# Patient Record
Sex: FEMALE | Race: BLACK OR AFRICAN AMERICAN | NOT HISPANIC OR LATINO | Employment: FULL TIME | URBAN - METROPOLITAN AREA
[De-identification: names, ages, dates, MRNs, and addresses within clinical notes are randomized per-mention and may not be internally consistent; named-entity substitution may affect disease eponyms.]

---

## 2019-07-30 ENCOUNTER — TRANSCRIBE ORDERS (OUTPATIENT)
Dept: ADMINISTRATIVE | Facility: HOSPITAL | Age: 39
End: 2019-07-30

## 2019-07-30 DIAGNOSIS — M31.30 WEGENER'S GRANULOMATOSIS: Primary | ICD-10-CM

## 2019-08-11 ENCOUNTER — HOSPITAL ENCOUNTER (OUTPATIENT)
Dept: CT IMAGING | Facility: HOSPITAL | Age: 39
Discharge: HOME | End: 2019-08-11
Payer: COMMERCIAL

## 2019-08-11 DIAGNOSIS — M31.30 WEGENER'S GRANULOMATOSIS: ICD-10-CM

## 2019-08-11 PROCEDURE — 71260 CT THORAX DX C+: CPT

## 2019-08-11 RX ADMIN — IOHEXOL 85 ML: 350 INJECTION, SOLUTION INTRAVENOUS at 13:06

## 2019-08-14 ENCOUNTER — HOSPITAL ENCOUNTER (EMERGENCY)
Facility: HOSPITAL | Age: 39
Discharge: HOME | End: 2019-08-14
Attending: EMERGENCY MEDICINE
Payer: COMMERCIAL

## 2019-08-14 VITALS
RESPIRATION RATE: 18 BRPM | TEMPERATURE: 98.1 F | SYSTOLIC BLOOD PRESSURE: 138 MMHG | HEART RATE: 90 BPM | WEIGHT: 213.85 LBS | DIASTOLIC BLOOD PRESSURE: 70 MMHG | OXYGEN SATURATION: 100 %

## 2019-08-14 DIAGNOSIS — J98.01 BRONCHOSPASM: Primary | ICD-10-CM

## 2019-08-14 DIAGNOSIS — M31.30 WEGENER'S GRANULOMATOSIS (GRANULOMATOSIS WITH POLYANGIITIS): ICD-10-CM

## 2019-08-14 PROCEDURE — 99284 EMERGENCY DEPT VISIT MOD MDM: CPT

## 2019-08-14 PROCEDURE — 94640 AIRWAY INHALATION TREATMENT: CPT

## 2019-08-14 PROCEDURE — 99284 EMERGENCY DEPT VISIT MOD MDM: CPT | Performed by: EMERGENCY MEDICINE

## 2019-08-14 RX ORDER — PREDNISONE 10 MG/1
TABLET ORAL
Qty: 54 TABLET | Refills: 0 | Status: SHIPPED | OUTPATIENT
Start: 2019-08-14 | End: 2019-09-16 | Stop reason: ALTCHOICE

## 2019-08-14 RX ORDER — PREDNISONE 20 MG/1
60 TABLET ORAL ONCE
Status: COMPLETED | OUTPATIENT
Start: 2019-08-14 | End: 2019-08-14

## 2019-08-14 RX ORDER — ALBUTEROL SULFATE 2.5 MG/3ML
5 SOLUTION RESPIRATORY (INHALATION) ONCE
Status: COMPLETED | OUTPATIENT
Start: 2019-08-14 | End: 2019-08-14

## 2019-08-14 RX ORDER — ACETAMINOPHEN 325 MG/1
650 TABLET ORAL ONCE
Status: DISCONTINUED | OUTPATIENT
Start: 2019-08-14 | End: 2019-08-14

## 2019-08-14 RX ORDER — ALBUTEROL SULFATE 2.5 MG/3ML
5 SOLUTION RESPIRATORY (INHALATION) ONCE
Status: DISCONTINUED | OUTPATIENT
Start: 2019-08-14 | End: 2019-08-14

## 2019-08-14 RX ORDER — ALBUTEROL SULFATE 90 UG/1
2 AEROSOL, METERED RESPIRATORY (INHALATION) EVERY 4 HOURS PRN
Qty: 1 INHALER | Refills: 0 | Status: SHIPPED | OUTPATIENT
Start: 2019-08-14 | End: 2020-01-03 | Stop reason: SDUPTHER

## 2019-08-14 RX ADMIN — IPRATROPIUM BROMIDE 0.5 MG: 0.5 SOLUTION RESPIRATORY (INHALATION) at 20:05

## 2019-08-14 RX ADMIN — IPRATROPIUM BROMIDE 0.5 MG: 0.5 SOLUTION RESPIRATORY (INHALATION) at 18:03

## 2019-08-14 RX ADMIN — ALBUTEROL SULFATE 5 MG: 2.5 SOLUTION RESPIRATORY (INHALATION) at 18:02

## 2019-08-14 RX ADMIN — ALBUTEROL SULFATE 5 MG: 2.5 SOLUTION RESPIRATORY (INHALATION) at 20:07

## 2019-08-14 RX ADMIN — PREDNISONE 60 MG: 20 TABLET ORAL at 18:27

## 2019-08-14 NOTE — ED PROVIDER NOTES
History  Chief Complaint   Patient presents with    Breathing Difficulty     Pt presents to ED due to c/o intermittent wheezing for past month  CT scan performed as outpatient with rheumatologist, lung nodules discovered  Denies hx asthma  79-year-old female with a past medical history significant for Wegener's granulomatosis presents today complaining of worsening shortness of breath over the last week  States her symptoms are worse with increased humidity  Had a CT scan done on Sunday by her rheumatologist  Jg Lopez changes consistent with Wegeners granulomatosis and pulmonary nodules that were recommended to have follow up annually  History provided by:  Patient  Shortness of Breath   Severity:  Moderate  Onset quality:  Gradual  Timing:  Constant  Progression:  Worsening  Chronicity:  New  Context comment:  See HPI  Relieved by:  None tried  Worsened by:  Nothing  Ineffective treatments:  None tried  Associated symptoms: wheezing    Associated symptoms: no abdominal pain, no chest pain, no fever, no headaches, no hemoptysis, no rash, no sore throat and no sputum production    Risk factors: no hx of cancer, no hx of PE/DVT, no prolonged immobilization and no tobacco use        None       Past Medical History:   Diagnosis Date    Wegener's granulomatosis ([facility] [address] )        History reviewed  No pertinent surgical history  History reviewed  No pertinent family history  I have reviewed and agree with the history as documented  Social History     Tobacco Use    Smoking status: Never Smoker    Smokeless tobacco: Never Used   Substance Use Topics    Alcohol use: Yes     Comment: social    Drug use: Never        Review of Systems   Constitutional: Negative for chills, fatigue and fever  HENT: Negative for postnasal drip, sore throat and trouble swallowing  Respiratory: Positive for chest tightness, shortness of breath and wheezing  Negative for hemoptysis and sputum production      Cardiovascular: Negative for chest pain  Gastrointestinal: Negative for abdominal pain  Genitourinary: Negative for dysuria  Musculoskeletal: Negative for back pain  Skin: Negative for rash  Allergic/Immunologic: Negative for immunocompromised state  Neurological: Negative for dizziness, light-headedness and headaches  Psychiatric/Behavioral: Negative for confusion  Physical Exam  Physical Exam   Constitutional: She is oriented to person, place, and time  She appears well-developed and well-nourished  HENT:   Head: Normocephalic and atraumatic  Mouth/Throat: Uvula is midline, oropharynx is clear and moist and mucous membranes are normal  No tonsillar exudate  Eyes: Pupils are equal, round, and reactive to light  Neck: Normal range of motion  Neck supple  Cardiovascular: Normal rate and regular rhythm  Pulmonary/Chest: Effort normal    Decreased breath sounds in all lung fields  Abdominal: Soft  Bowel sounds are normal  There is no tenderness  There is no rebound and no guarding  Musculoskeletal: She exhibits no edema, tenderness or deformity  Neurological: She is alert and oriented to person, place, and time  Patient moving all extremities equally, no focal neuro deficits noted  Skin: Skin is warm and dry  Capillary refill takes less than 2 seconds  Psychiatric: She has a normal mood and affect  Nursing note and vitals reviewed        Vital Signs  ED Triage Vitals [08/14/19 1807]   Temperature Pulse Respirations Blood Pressure SpO2   98 1 °F (36 7 °C) 88 22 136/76 98 %      Temp Source Heart Rate Source Patient Position - Orthostatic VS BP Location FiO2 (%)   Axillary Monitor Sitting Right arm --      Pain Score       No Pain           Vitals:    08/14/19 1807 08/14/19 1815   BP: 136/76 136/76   Pulse: 88 88   Patient Position - Orthostatic VS: Sitting          Visual Acuity      ED Medications  Medications   albuterol inhalation solution 5 mg (5 mg Nebulization Given 8/14/19 1802) ipratropium (ATROVENT) 0 02 % inhalation solution 0 5 mg (0 5 mg Nebulization Given 8/14/19 1803)   predniSONE tablet 60 mg (60 mg Oral Given 8/14/19 1827)       Diagnostic Studies  Results Reviewed     None                 No orders to display              Procedures  Procedures       ED Course                               MDM  Number of Diagnoses or Management Options  Bronchospasm: new and requires workup  Wegener's granulomatosis (granulomatosis with polyangiitis) [facility]): new and requires workup  Diagnosis management comments: 7:26 PM  Feeling better after nebulizer  Lung sounds improved  Will observe in the ED for awhile and d/c home if remains asymptomatic  RTED instructions reviewed  Amount and/or Complexity of Data Reviewed  Tests in the radiology section of CPT®: reviewed  Review and summarize past medical records: yes    Risk of Complications, Morbidity, and/or Mortality  Presenting problems: moderate  Diagnostic procedures: moderate  Management options: moderate    Patient Progress  Patient progress: stable      Disposition  Final diagnoses:   Bronchospasm   Wegener's granulomatosis (granulomatosis with polyangiitis) ([facility] [address] )     Time reflects when diagnosis was documented in both MDM as applicable and the Disposition within this note     Time User Action Codes Description Comment    8/14/2019  7:26 PM Ariella Mcdonnell Add [J98 01] Bronchospasm     8/14/2019  7:27 PM Salinas Bartlett Add [M31 30] Wegener's granulomatosis (granulomatosis with polyangiitis) [facility])       ED Disposition     ED Disposition Condition Date/Time Comment    Discharge Stable Wed Aug 14, 2019  7:26 PM Genaro Libel discharge to home/self care              Follow-up Information     Follow up With Specialties Details Why Contact Info Additional Information    Alexx Jewell MD Family Medicine Schedule an appointment as soon as possible for a visit   [address]  [facility] [id number]  [facility] Schedule an appointment as soon as possible for a visit   [phone]  [address] [facility], [address] [place] [address] Emergency Department Emergency Medicine  If symptoms worsen [address] [facility]  AN ED, [address], [place], [place], [address]          Patient's Medications   Discharge Prescriptions    ALBUTEROL (PROVENTIL HFA,VENTOLIN HFA) 90 MCG/ACT INHALER    Inhale 2 puffs every 4 (four) hours as needed for wheezing       Start Date: 8/14/2019 End Date: --       Order Dose: 2 puffs       Quantity: 1 Inhaler    Refills: 0    PREDNISONE 10 MG TABLET    Take 6 tabs PO for 3 days, then 5 tabs PO for 3 days, then 4 tabs PO for 3 days, then 3 tabs PO for 3 days, then 2 tabs PO for 3 days then 1 tab PO for 3 days  Start Date: 8/14/2019 End Date: --       Order Dose: --       Quantity: 54 tablet    Refills: 0     No discharge procedures on file      ED Provider  Electronically Signed by           Rodolfo Ramos DO  08/14/19 [id number]

## 2019-08-14 NOTE — ED NOTES
Patient feeling "symptomatic again", walking with pulse oxgen monitor=100% with "breathing difficulty" per patient  Dr Madelyn Stephenson aware  Another breathing treatment to be ordered       [id number], RN  08/14/19 1958

## 2019-08-15 NOTE — ED NOTES
Patient "feels great" and going to be discharged per MD okay  Discharge instructions reviewed with patient and family  No further questions or concerns          [address], RN  08/14/19 6347

## 2019-08-16 ENCOUNTER — OFFICE VISIT (OUTPATIENT)
Dept: PULMONOLOGY | Facility: CLINIC | Age: 39
End: 2019-08-16
Payer: COMMERCIAL

## 2019-08-16 VITALS
WEIGHT: 208.2 LBS | DIASTOLIC BLOOD PRESSURE: 84 MMHG | HEART RATE: 98 BPM | RESPIRATION RATE: 18 BRPM | OXYGEN SATURATION: 97 % | BODY MASS INDEX: 29.15 KG/M2 | SYSTOLIC BLOOD PRESSURE: 122 MMHG | HEIGHT: 71 IN | TEMPERATURE: 98.5 F

## 2019-08-16 DIAGNOSIS — IMO0001: ICD-10-CM

## 2019-08-16 DIAGNOSIS — R09.82 POST-NASAL DRIP: ICD-10-CM

## 2019-08-16 DIAGNOSIS — R06.02 SHORTNESS OF BREATH: ICD-10-CM

## 2019-08-16 DIAGNOSIS — R91.8 MULTIPLE LUNG NODULES ON CT: Primary | ICD-10-CM

## 2019-08-16 PROCEDURE — 99214 OFFICE O/P EST MOD 30 MIN: CPT | Performed by: INTERNAL MEDICINE

## 2019-08-16 PROCEDURE — 94618 PULMONARY STRESS TESTING: CPT | Performed by: INTERNAL MEDICINE

## 2019-08-16 PROCEDURE — 94060 EVALUATION OF WHEEZING: CPT | Performed by: INTERNAL MEDICINE

## 2019-08-16 RX ORDER — FLUTICASONE FUROATE AND VILANTEROL 100; 25 UG/1; UG/1
1 POWDER RESPIRATORY (INHALATION) DAILY
Qty: 2 INHALER | Refills: 0 | Status: SHIPPED | COMMUNITY
Start: 2019-08-16 | End: 2019-09-16 | Stop reason: ALTCHOICE

## 2019-08-16 RX ORDER — PRENATAL VIT/IRON FUM/FOLIC AC 28MG-0.8MG
1 TABLET ORAL DAILY
Refills: 0 | COMMUNITY
Start: 2019-05-23 | End: 2021-08-06

## 2019-08-16 RX ORDER — VALACYCLOVIR HYDROCHLORIDE 500 MG/1
TABLET, FILM COATED ORAL
Refills: 0 | COMMUNITY
Start: 2019-05-10 | End: 2020-05-28 | Stop reason: SDUPTHER

## 2019-08-16 RX ORDER — FLUCONAZOLE 150 MG/1
TABLET ORAL
Refills: 0 | COMMUNITY
Start: 2019-05-23 | End: 2020-05-28 | Stop reason: SDUPTHER

## 2019-08-16 NOTE — ASSESSMENT & PLAN NOTE
-her in office spirometry did show significant response to bronchodilators which could signify underlying asthma versus a flare of her Naveed's  She reports possibly having a remote skin allergy test but does not recall the results but does state she has seasonal allergies  A 6 minutes walk test did not reveal any significant oxygen desaturation    Plan:  -patient given samples of Breo Ellipta 100 mcg 1 puff daily for trial   -continue rescue inhaler with albuterol 2 puffs q 4 hours p r n   -to also have a follow-up CT scan in 1 month in regards to her ground-glass nodules  -if her symptoms do not improve on the Breo, patient may require full pulmonary function testing, possibly an echocardiogram since she is at risk for pulmonary artery hypertension

## 2019-08-16 NOTE — ASSESSMENT & PLAN NOTE
-unclear etiology with this is related to her underlying Naveed's versus a small area of consolidation due to an infectious process  Her current symptoms appear to be related to possible underlying asthma versus her Naveed's in general and not related to the nodules in particular    Plan:  -we will repeat CT scan in 1 month  -patient will follow-up after CT scan for further evaluation

## 2019-08-16 NOTE — PROGRESS NOTES
Pulmonary Consultation   Genaro Mota 43 y o  female MRN: [id number]  8/16/2019      Assessment:    Multiple lung nodules on CT  -unclear etiology with this is related to her underlying Naveed's versus a small area of consolidation due to an infectious process  Her current symptoms appear to be related to possible underlying asthma versus her Naveed's in general and not related to the nodules in particular  Plan:  -we will repeat CT scan in 1 month  -patient will follow-up after CT scan for further evaluation    Post-nasal drip  -patient was previously told to try Flonase but she has not tried in the past   Her symptoms are typical of postnasal drip including the sensation mucus with in her throat and a morning cough  Plan  -instructed the patient to try over-the-counter Flonase    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)  -patient has a scheduled follow-up with rheumatology next Monday  Apparently her symptoms may be related to [facility] and she is on steroids from her recent hospitalization  She is now on chronic immunosuppressive therapy  She was on Cytoxan which she stopped 3 years ago  Shortness of breath  -her in office spirometry did show significant response to bronchodilators which could signify underlying asthma versus a flare of her Naveed's  She reports possibly having a remote skin allergy test but does not recall the results but does state she has seasonal allergies  A 6 minutes walk test did not reveal any significant oxygen desaturation    Plan:  -patient given samples of Breo Ellipta 100 mcg 1 puff daily for trial   -continue rescue inhaler with albuterol 2 puffs q 4 hours p r n   -to also have a follow-up CT scan in 1 month in regards to her ground-glass nodules  -if her symptoms do not improve on the Breo, patient may require full pulmonary function testing, possibly an echocardiogram since she is at risk for pulmonary artery hypertension      Plan:    Diagnoses and all orders for this visit:    Multiple lung nodules on CT  -     CT chest without contrast; Future    Shortness of breath  -     POCT spirometry  with bronchodilator  -     POCT 6 minute walk  -     fluticasone-vilanterol (BREO ELLIPTA) 100-25 mcg/inh inhaler; Inhale 1 puff daily Rinse mouth after use  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[address] )  -     fluticasone-vilanterol (BREO ELLIPTA) 100-25 mcg/inh inhaler; Inhale 1 puff daily Rinse mouth after use  -     CT chest without contrast; Future    Post-nasal drip    Other orders  -     fluconazole (DIFLUCAN) 150 mg tablet; take 1 tablet by mouth ONCE FOR 1 DAY  -     Prenatal Vit-Fe Fumarate-FA (RA PRENATAL) 28-0 8 MG TABS; Take 1 tablet by mouth daily  -     valACYclovir (VALTREX) 500 mg tablet        History of Present Illness   HPI:  Genaro Mota is a 43 y o  female with a history of Wegeners granulomatosis who follows up with Rheumatology that presents due to recent increase in SOB and recent groundglass nodules noted on a CT of the chest    For the past 6 weeks patient has had increased SOB on exertion and occasionally at rest, dyspnea and wheezing  She reports that these symptoms were similar to the initial symptoms that lead to the diagnosis of her wegeners in 2007  She uses a smartphone health salo which she notes has recorded her pulse as being elevated even at rest  She recently went to the hospital due to SOB and was started on albuterol and steroid taper  She does have a f/up with her rheumatologist on Monday  She did use the inhaler yesterday and it did help  She does also mention experiencing epistaxis once per week and also a sensation of post nasal drip at night which causes her to wake up multiple times at night  She also has a cough in the morning that is nonproductive  She denies any fevers, chills, recent hemoptysis although she does report having some in the past but cannot tell whether this was due to epistaxis      Review of Systems   Constitutional: Positive for fatigue  Negative for chills, diaphoresis and fever  HENT: Positive for nosebleeds and postnasal drip  Negative for congestion, ear pain, rhinorrhea, sinus pressure, sinus pain, sneezing, sore throat, trouble swallowing and voice change  Eyes: Negative for pain, redness and itching  Respiratory: Positive for cough and wheezing  Negative for apnea, choking, chest tightness, shortness of breath and stridor  Cardiovascular: Negative for chest pain, palpitations and leg swelling  Gastrointestinal: Negative for abdominal distention, abdominal pain, blood in stool, constipation, diarrhea, nausea and vomiting  Endocrine: Negative for polydipsia and polyuria  Genitourinary: Negative for dysuria, flank pain, frequency and hematuria  Musculoskeletal: Negative for arthralgias, back pain, joint swelling, myalgias, neck pain and neck stiffness  Skin: Negative for color change, pallor and rash  Neurological: Negative for dizziness, seizures, syncope, weakness, light-headedness, numbness and headaches  Hematological: Negative for adenopathy  Psychiatric/Behavioral: Negative for agitation and confusion  Historical Information   Past Medical History:   Diagnosis Date    Multiple lung nodules on CT 8/16/2019    Shortness of breath 8/16/2019    Wegener's granulomatosis ([facility] [address] )      History reviewed  No pertinent surgical history  History reviewed  No pertinent family history  Occupational History: Works for [employer] as a  for the past 20 years and this requires her being outside 40-50% of the time  Social History: lifelong nonsmoker, social alcohol, no drug use  Patient lives with  and son  She does have a dog at home for the past 6 years       Meds/Allergies     Current Outpatient Medications:     albuterol (PROVENTIL HFA,VENTOLIN HFA) 90 mcg/act inhaler, Inhale 2 puffs every 4 (four) hours as needed for wheezing, Disp: 1 Inhaler, Rfl: 0    fluconazole (DIFLUCAN) 150 mg tablet, take 1 tablet by mouth ONCE FOR 1 DAY, Disp: , Rfl: 0    predniSONE 10 mg tablet, Take 6 tabs PO for 3 days, then 5 tabs PO for 3 days, then 4 tabs PO for 3 days, then 3 tabs PO for 3 days, then 2 tabs PO for 3 days then 1 tab PO for 3 days  , Disp: 54 tablet, Rfl: 0    Prenatal Vit-Fe Fumarate-FA (RA PRENATAL) 28-0 8 MG TABS, Take 1 tablet by mouth daily, Disp: , Rfl: 0    valACYclovir (VALTREX) 500 mg tablet, , Disp: , Rfl: 0    fluticasone-vilanterol (BREO ELLIPTA) 100-25 mcg/inh inhaler, Inhale 1 puff daily Rinse mouth after use , Disp: 2 Inhaler, Rfl: 0  Allergies   Allergen Reactions    Ibuprofen Hives    Other      seasonal       Vitals: Blood pressure 122/84, pulse 98, temperature 98 5 °F (36 9 °C), resp  rate 18, height 5' 11" (1 803 m), weight 94 4 kg (208 lb 3 2 oz), SpO2 97 %  , Body mass index is 29 04 kg/m²  Oxygen Therapy  SpO2: 97 %  Oxygen Therapy: None (Room air)    Physical Exam  Physical Exam   Constitutional: She is oriented to person, place, and time  No distress  HENT:   Head: Normocephalic and atraumatic  Nose: Nose normal    Mouth/Throat: Oropharynx is clear and moist  No oropharyngeal exudate  Eyes: Pupils are equal, round, and reactive to light  Conjunctivae and EOM are normal  No scleral icterus  Neck: Normal range of motion  Neck supple  No JVD present  No tracheal deviation present  No thyromegaly present  Cardiovascular: Normal rate, regular rhythm, normal heart sounds and intact distal pulses  Exam reveals no gallop and no friction rub  No murmur heard  Pulmonary/Chest: Effort normal and breath sounds normal  No stridor  No respiratory distress  She has no wheezes  She has no rales  Abdominal: Soft  Bowel sounds are normal  She exhibits no distension  There is no tenderness  There is no rebound and no guarding  Musculoskeletal: Normal range of motion  She exhibits no edema or deformity     Lymphadenopathy:     She has no cervical adenopathy  Neurological: She is alert and oriented to person, place, and time  No cranial nerve deficit or sensory deficit  Skin: Skin is warm  No rash noted  She is not diaphoretic  No erythema  Psychiatric: She has a normal mood and affect  Labs: none on file    Imaging and other studies: I have personally reviewed pertinent reports  CT 08/11/2019- shows 2 small ground-glass opacification in the right lower lobe otherwise lungs are clear bilaterally without any effusions, masses  Pulmonary function testing: none on file    6 minutes walk test:  Patient ambulated 504 m  Resting pulse ox of 97% which actually increased to 100% while ambulation  Resting heart rate of 97 beats per minute which increased to 119      Spirometry pre and post bronchodilation:  Pre-  FEV1- 47% predicted  FVC 62% predicted  FEV1/FVC 63%  Post-  FEV1 55% predicted  FVC 62% predicted  FEV1/FVC 79%  There was a 15% increase in FEV1 post bronchodilator along with greater than a 200 mL increase    EKG, Pathology, and Other Studies: none on file    Jenn Giron MD  Pulmonary and Critical Care Fellow- PGY 4  [facility]

## 2019-08-16 NOTE — ASSESSMENT & PLAN NOTE
-patient has a scheduled follow-up with rheumatology next Monday  Apparently her symptoms may be related to [facility] and she is on steroids from her recent hospitalization  She is now on chronic immunosuppressive therapy  She was on Cytoxan which she stopped 3 years ago

## 2019-08-16 NOTE — ASSESSMENT & PLAN NOTE
-patient was previously told to try Flonase but she has not tried in the past   Her symptoms are typical of postnasal drip including the sensation mucus with in her throat and a morning cough    Plan  -instructed the patient to try over-the-counter Flonase

## 2019-09-04 ENCOUNTER — TELEPHONE (OUTPATIENT)
Dept: PULMONOLOGY | Facility: CLINIC | Age: 39
End: 2019-09-04

## 2019-09-04 DIAGNOSIS — R91.8 MULTIPLE LUNG NODULES ON CT: Primary | ICD-10-CM

## 2019-09-04 NOTE — PROGRESS NOTES
Insurance is not approving repeat chest CT at this time  We will plan to proceed with chest x-ray    If nodules do not appear to be more prominent, we can wait to repeat the CT until November

## 2019-09-04 NOTE — TELEPHONE ENCOUNTER
Received denial letter from [facility] regarding our request for a chest CT for Ciara Aguayo  After discussing with Dr David Drake, I called Sonal Rowe and left a message for her  I explained that I was canceling the CT schedule for 9/9/19  I also explained that Dr David Drake had placed an order for a CXR  The order is in the Epic system and she can go to any [facility] to have it done  I left my contact information and asked Sonal Rowe to call me if she had any questions

## 2019-09-16 ENCOUNTER — OFFICE VISIT (OUTPATIENT)
Dept: PULMONOLOGY | Facility: CLINIC | Age: 39
End: 2019-09-16
Payer: COMMERCIAL

## 2019-09-16 VITALS
HEART RATE: 102 BPM | HEIGHT: 71 IN | RESPIRATION RATE: 18 BRPM | SYSTOLIC BLOOD PRESSURE: 130 MMHG | TEMPERATURE: 99 F | WEIGHT: 208 LBS | OXYGEN SATURATION: 98 % | BODY MASS INDEX: 29.12 KG/M2 | DIASTOLIC BLOOD PRESSURE: 86 MMHG

## 2019-09-16 DIAGNOSIS — R91.8 MULTIPLE LUNG NODULES ON CT: ICD-10-CM

## 2019-09-16 DIAGNOSIS — R09.82 POST-NASAL DRIP: Primary | ICD-10-CM

## 2019-09-16 PROBLEM — R06.02 SHORTNESS OF BREATH: Status: RESOLVED | Noted: 2019-08-16 | Resolved: 2019-09-16

## 2019-09-16 PROCEDURE — 99214 OFFICE O/P EST MOD 30 MIN: CPT | Performed by: INTERNAL MEDICINE

## 2019-09-16 RX ORDER — FLUTICASONE PROPIONATE 50 MCG
2 SPRAY, SUSPENSION (ML) NASAL DAILY
Qty: 1 BOTTLE | Refills: 2 | Status: SHIPPED | OUTPATIENT
Start: 2019-09-16 | End: 2021-08-06

## 2019-09-16 RX ORDER — CETIRIZINE HYDROCHLORIDE 10 MG/1
10 TABLET ORAL DAILY
Qty: 30 TABLET | Refills: 0
Start: 2019-09-16 | End: 2021-08-06

## 2019-09-16 NOTE — ASSESSMENT & PLAN NOTE
She complains of postnasal drip  I instructed her to take Flonase once daily and Zyrtec once daily  There is no evidence of acute  bacterial infectious process

## 2019-09-16 NOTE — PROGRESS NOTES
Pulmonary Follow Up Note   Kaley Murray 43 y o  female MRN: [id number]  9/16/2019      Assessment/Plan:     Multiple lung nodules on CT    I suspect that the previously noted lung nodules were related to inflammatory process, as her symptoms have all resolved  She completed a course of prednisone  I would like her to have a repeat chest x-ray  If there are persistent nodules, then I will again ask the insurance company for approval for chest CT for direct comparison to the August scan  Call her with chest x-ray results  Post-nasal drip    She complains of postnasal drip  I instructed her to take Flonase once daily and Zyrtec once daily  There is no evidence of acute  bacterial infectious process  Visit orders:    Diagnoses and all orders for this visit:    Post-nasal drip  -     fluticasone (FLONASE) 50 mcg/act nasal spray; 2 sprays into each nostril daily  -     cetirizine (ZyrTEC) 10 mg tablet; Take 1 tablet (10 mg total) by mouth daily    Multiple lung nodules on CT      History of Present Illness   HPI:  Kaley Murray is a 43 y o  female who  Is here today for follow-up regarding pulmonary nodules  She completed the course of prednisone per Rheumatology last week  Overall, her symptoms have completely resolved  She is no longer coughing or wheezing  This week, she developed sinus congestion with postnasal drip  She denies associated fevers or chills  She denies hemoptysis or epistaxis  She denies shortness of breath and is back to taking walks on a regular basis  She is not using any inhalers  At our last visit, I had ordered a repeat chest CT for this month  Insurance did not cover it, so I ordered a chest x-ray  Patient has not had that completed  Review of Systems    She denies headaches or vision changes  She complains of sneezing, sinus congestion and postnasal drip  She denies sore throat  She denies arthralgias or myalgias  She denies chest pain    She denies nausea, vomiting or diarrhea  She has some insomnia  She denies lower extremity edema or calf tenderness  She denies dysuria or hematuria  All other review of systems are negative  Historical Information   Past Medical History:   Diagnosis Date    Multiple lung nodules on CT 8/16/2019    Shortness of breath 8/16/2019    Wegener's granulomatosis ([facility] [address] )      History reviewed  No pertinent surgical history  History reviewed  No pertinent family history  Social History     Tobacco Use   Smoking Status Never Smoker   Smokeless Tobacco Never Used         Meds/Allergies     Current Outpatient Medications:     albuterol (PROVENTIL HFA,VENTOLIN HFA) 90 mcg/act inhaler, Inhale 2 puffs every 4 (four) hours as needed for wheezing, Disp: 1 Inhaler, Rfl: 0    cetirizine (ZyrTEC) 10 mg tablet, Take 1 tablet (10 mg total) by mouth daily, Disp: 30 tablet, Rfl: 0    fluconazole (DIFLUCAN) 150 mg tablet, take 1 tablet by mouth ONCE FOR 1 DAY, Disp: , Rfl: 0    fluticasone (FLONASE) 50 mcg/act nasal spray, 2 sprays into each nostril daily, Disp: 1 Bottle, Rfl: 2    Prenatal Vit-Fe Fumarate-FA (RA PRENATAL) 28-0 8 MG TABS, Take 1 tablet by mouth daily, Disp: , Rfl: 0    valACYclovir (VALTREX) 500 mg tablet, , Disp: , Rfl: 0  Allergies   Allergen Reactions    Ibuprofen Hives    Other      seasonal       Vitals: Blood pressure 130/86, pulse 102, temperature 99 °F (37 2 °C), temperature source Tympanic, resp  rate 18, height 5' 11" (1 803 m), weight 94 3 kg (208 lb), SpO2 98 %  Body mass index is 29 01 kg/m²  Oxygen Therapy  SpO2: 98 %    Physical Exam   Constitutional: She is oriented to person, place, and time  No distress  HENT:   Head: Normocephalic  Mouth/Throat: No oropharyngeal exudate  Eyes: Pupils are equal, round, and reactive to light  No scleral icterus  Neck: Neck supple  No JVD present  Cardiovascular: Normal rate and regular rhythm  No murmur heard  Pulmonary/Chest: She has no wheezes   She has no rales    Abdominal: Soft  There is no tenderness  Musculoskeletal: She exhibits no edema  Lymphadenopathy:     She has no cervical adenopathy  Neurological: She is alert and oriented to person, place, and time  Skin: Skin is warm and dry  Psychiatric: She has a normal mood and affect

## 2019-09-16 NOTE — ASSESSMENT & PLAN NOTE
I suspect that the previously noted lung nodules were related to inflammatory process, as her symptoms have all resolved  She completed a course of prednisone  I would like her to have a repeat chest x-ray  If there are persistent nodules, then I will again ask the insurance company for approval for chest CT for direct comparison to the August scan  Call her with chest x-ray results

## 2019-10-14 ENCOUNTER — HOSPITAL ENCOUNTER (OUTPATIENT)
Dept: RADIOLOGY | Facility: HOSPITAL | Age: 39
Discharge: HOME | End: 2019-10-14
Attending: INTERNAL MEDICINE
Payer: COMMERCIAL

## 2019-10-14 ENCOUNTER — TRANSCRIBE ORDERS (OUTPATIENT)
Dept: RADIOLOGY | Facility: HOSPITAL | Age: 39
End: 2019-10-14

## 2019-10-14 DIAGNOSIS — R91.8 MULTIPLE LUNG NODULES ON CT: ICD-10-CM

## 2019-10-14 PROCEDURE — 71046 X-RAY EXAM CHEST 2 VIEWS: CPT

## 2019-10-22 ENCOUNTER — TELEPHONE (OUTPATIENT)
Dept: PULMONOLOGY | Facility: CLINIC | Age: 39
End: 2019-10-22

## 2019-10-22 NOTE — TELEPHONE ENCOUNTER
Spoke with Chrissy Donnelly  She says she has been sick for a week  She was in [place] last week surrounded by smoke  She thought she would get better, but she has not  She has difficulty breathing doing routine activities  She has to stop and sit down  She says she also has a cold  She has a cough which first produced mucous, then was a dry cough and now producing mucous  She says the mucous is yellow and blood tinged  She has occasional wheezing and chest tightness with exertion  She has night sweats but says that is nothing new for her  Denies fever or chills  She uses her rescue inhaler but it does not help much  Please advise

## 2019-10-22 NOTE — TELEPHONE ENCOUNTER
Spoke with pt by phone  She has been sick for about 1 week since returning from her recent trip to [facility]  She had CXR done 10/14/19 which was clear showing no signs of infection or effusions  She states that she felt much better when on steroids and Breo  She complains of increased cough with some yellowish mucous  No fevers  She also has wheezing and chest tightness  She tells me "I do have a cold, but this is more than a cold "  She is unable to walk up a flight of stairs without having to stop to catch her breath  She also has noticed conversational dyspnea  She does not feel that she needs to go to the ER because "it's not going to change anything "  I have instructed her to start 40mg of prednisone and taper by 10mg every 3 days  She has prednisone at home and does not need a prescription at this time  She is also to restart Breo, which she also has  She was instructed to wash mouth out after each use  She will continue to use albuterol rescue inhaler as needed  She would like to be seen by Dr Bo Huerta but would be agreeable to be seen by another provider if she cannot see her  She would be agreeable to be seen in the [facility] or [place] offices  She is aware that if her breathing acutely worsens she needs to go to the ER for evaluation      I am in [facility] office tomorrow - we could have her see me at 3PM if that works for her

## 2019-10-23 NOTE — TELEPHONE ENCOUNTER
Spoke with patient  She says she is feeling better today  She is going to finish her taper and call us if this happens again

## 2019-11-29 ENCOUNTER — OFFICE VISIT (OUTPATIENT)
Dept: PULMONOLOGY | Facility: CLINIC | Age: 39
End: 2019-11-29
Payer: COMMERCIAL

## 2019-11-29 VITALS
WEIGHT: 210 LBS | OXYGEN SATURATION: 100 % | TEMPERATURE: 98.1 F | HEART RATE: 109 BPM | HEIGHT: 71 IN | BODY MASS INDEX: 29.4 KG/M2 | DIASTOLIC BLOOD PRESSURE: 80 MMHG | SYSTOLIC BLOOD PRESSURE: 114 MMHG | RESPIRATION RATE: 16 BRPM

## 2019-11-29 DIAGNOSIS — IMO0001: Primary | ICD-10-CM

## 2019-11-29 DIAGNOSIS — R06.02 SHORTNESS OF BREATH: ICD-10-CM

## 2019-11-29 DIAGNOSIS — R91.8 MULTIPLE LUNG NODULES ON CT: ICD-10-CM

## 2019-11-29 PROCEDURE — 99215 OFFICE O/P EST HI 40 MIN: CPT | Performed by: INTERNAL MEDICINE

## 2019-11-29 RX ORDER — FLUTICASONE FUROATE AND VILANTEROL 200; 25 UG/1; UG/1
1 POWDER RESPIRATORY (INHALATION) DAILY
Qty: 1 INHALER | Refills: 5 | Status: SHIPPED | OUTPATIENT
Start: 2019-11-29 | End: 2021-08-06

## 2019-11-29 NOTE — ASSESSMENT & PLAN NOTE
Once she has come off the steroids, we will repeat chest CT for follow-up of previously noted pulmonary nodules  This will help determine whether we are dealing with underlying  Naveed's granulomatosis  I would favor obtaining the scan once she is off steroids, as the steroids may obscure the results  It is imperative that we follow up these lung nodules, particularly given the solid component in the larger of the 2 nodules

## 2019-11-29 NOTE — ASSESSMENT & PLAN NOTE
I spoke with Dr Ratna Tran  During the patient's office visit today  For now, we will continue on the prednisone taper as outlined  If the chest imaging is suggestive of Naveed's granulomatosis, I would defer to her regarding which steroid sparing agent to treat with  We also reviewed her recent blood work which showed an elevated CA-3,  Which is a nonspecific finding, however would be  supportive of a diagnosis of Naveed's in the right clinical context

## 2019-11-29 NOTE — PATIENT INSTRUCTIONS
·  Start taking Breo Ellipta 200 mcg, 1 puff daily    Rinse mouth out after each use  ·  After you have been on the [facility] for 1 week (12/6/19), decrease prednisone to 15 mg daily  ·  On 12/13/19, decrease prednisone to 10 mg daily  ·  On 12/20/19, decrease prednisone 5 mg daily  ·  Discontinue prednisone on 12/27/19

## 2019-11-29 NOTE — PROGRESS NOTES
Pulmonary Follow Up Note   Rosa Victoria 43 y o  female MRN: [id number]  11/29/2019      Assessment/Plan:     Shortness of breath   The patient is struggling with shortness of breath every time she is weaned off of steroids  At our visit in August, spirometry showed moderate obstruction with bronchodilator response, raising the possibility of underlying asthma  In an effort to reduce the steroid dose, I will prescribe Breo Ellipta 200 mcg to use once daily  After she has been on the inhaler for 1 week, we will decrease prednisone by 5 mg every week, discontinuing completely on 12/27/19  Multiple lung nodules on CT    Once she has come off the steroids, we will repeat chest CT for follow-up of previously noted pulmonary nodules  This will help determine whether we are dealing with underlying  Naveed's granulomatosis  I would favor obtaining the scan once she is off steroids, as the steroids may obscure the results  It is imperative that we follow up these lung nodules, particularly given the solid component in the larger of the 2 nodules  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[address] )    I spoke with Dr Ayo Baxter  During the patient's office visit today  For now, we will continue on the prednisone taper as outlined  If the chest imaging is suggestive of Naveed's granulomatosis, I would defer to her regarding which steroid sparing agent to treat with  We also reviewed her recent blood work which showed an elevated WA-3,  Which is a nonspecific finding, however would be  supportive of a diagnosis of Naveed's in the right clinical context  Visit orders:    Diagnoses and all orders for this visit:    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[address] )  -     Complete PFT with post bronchodilator; Future  -     fluticasone-vilanterol (BREO ELLIPTA) 200-25 MCG/INH inhaler; Inhale 1 puff daily Rinse mouth after use      Multiple lung nodules on CT  -     CT chest without contrast; Future    Shortness of breath        Return in about 4 weeks (around 12/27/2019)  History of Present Illness   HPI:  Lori Edwards is a 43 y o  female who is here today for follow-up regarding shortness of breath  At our last visit, she was feeling well without any complaints of shortness of breath  Since then, she was weaned off steroids on 2 separate occasions  Both times, she developed significant shortness of breath within days of coming off of steroids  She has cough with mostly clear sputum, but it is occasionally blood tinged  She has no fevers or chills  Her shortness of breath is most notable when walking  She is currently on prednisone 20 mg daily, however would very much like to come off the steroids  She has an albuterol inhaler which she uses on occasion and finds minimal relief from it  She denies significant wheezing  Review of Systems   Constitutional: Negative for chills, fever and unexpected weight change  HENT: Positive for postnasal drip  Negative for sore throat  Eyes: Negative for visual disturbance  Respiratory:        As noted in HPI   Cardiovascular: Negative for chest pain  Gastrointestinal: Negative for abdominal pain, diarrhea and vomiting  Genitourinary: Negative for difficulty urinating  Skin: Negative for rash  Neurological: Negative for headaches  Hematological: Negative for adenopathy  Psychiatric/Behavioral: Negative  All other systems reviewed and are negative  Historical Information   Past Medical History:   Diagnosis Date    Multiple lung nodules on CT 8/16/2019    Shortness of breath 8/16/2019    Wegener's granulomatosis ([facility] [address] )      History reviewed  No pertinent surgical history  History reviewed  No pertinent family history      Social History     Tobacco Use   Smoking Status Never Smoker   Smokeless Tobacco Never Used         Meds/Allergies     Current Outpatient Medications:     albuterol (PROVENTIL HFA,VENTOLIN HFA) 90 mcg/act inhaler, Inhale 2 puffs every 4 (four) hours as needed for wheezing, Disp: 1 Inhaler, Rfl: 0    cetirizine (ZyrTEC) 10 mg tablet, Take 1 tablet (10 mg total) by mouth daily, Disp: 30 tablet, Rfl: 0    fluconazole (DIFLUCAN) 150 mg tablet, take 1 tablet by mouth ONCE FOR 1 DAY, Disp: , Rfl: 0    fluticasone (FLONASE) 50 mcg/act nasal spray, 2 sprays into each nostril daily, Disp: 1 Bottle, Rfl: 2    Prenatal Vit-Fe Fumarate-FA (RA PRENATAL) 28-0 8 MG TABS, Take 1 tablet by mouth daily, Disp: , Rfl: 0    valACYclovir (VALTREX) 500 mg tablet, , Disp: , Rfl: 0    fluticasone-vilanterol (BREO ELLIPTA) 200-25 MCG/INH inhaler, Inhale 1 puff daily Rinse mouth after use , Disp: 1 Inhaler, Rfl: 5  Allergies   Allergen Reactions    Ibuprofen Hives    Other      seasonal       Vitals: Blood pressure 114/80, pulse (!) 109, temperature 98 1 °F (36 7 °C), temperature source Tympanic, resp  rate 16, height 5' 11" (1 803 m), weight 95 3 kg (210 lb), SpO2 100 %  Body mass index is 29 29 kg/m²  Oxygen Therapy  SpO2: 100 %      Physical Exam   Constitutional: She is oriented to person, place, and time  No distress  HENT:   Head: Normocephalic  Mouth/Throat: No oropharyngeal exudate  Eyes: Pupils are equal, round, and reactive to light  No scleral icterus  Neck: Neck supple  No JVD present  Cardiovascular: Normal rate and regular rhythm  Pulmonary/Chest: She has no wheezes  She has no rales  Abdominal: Soft  There is no tenderness  Musculoskeletal: She exhibits no edema  Lymphadenopathy:     She has no cervical adenopathy  Neurological: She is alert and oriented to person, place, and time  Skin: Skin is warm and dry  Psychiatric: She has a normal mood and affect  Imaging and other studies: I have personally reviewed pertinent reports  and I have personally reviewed pertinent films in PACS   Chest CT from 8/11/19 shows ground-glass opacity in the right lower lobe  The largest area measures 11 x 18 mm with a 2 mm solid focus

## 2019-11-29 NOTE — ASSESSMENT & PLAN NOTE
The patient is struggling with shortness of breath every time she is weaned off of steroids  At our visit in August, spirometry showed moderate obstruction with bronchodilator response, raising the possibility of underlying asthma  In an effort to reduce the steroid dose, I will prescribe Breo Ellipta 200 mcg to use once daily  After she has been on the inhaler for 1 week, we will decrease prednisone by 5 mg every week, discontinuing completely on 12/27/19

## 2019-12-03 DIAGNOSIS — R06.02 SOB (SHORTNESS OF BREATH): Primary | ICD-10-CM

## 2019-12-03 RX ORDER — PREDNISONE 1 MG/1
TABLET ORAL
Qty: 62 TABLET | Refills: 0 | Status: SHIPPED | OUTPATIENT
Start: 2019-12-03 | End: 2020-01-03 | Stop reason: ALTCHOICE

## 2019-12-06 ENCOUNTER — HOSPITAL ENCOUNTER (OUTPATIENT)
Dept: PULMONOLOGY | Facility: HOSPITAL | Age: 39
Discharge: HOME | End: 2019-12-06
Attending: INTERNAL MEDICINE
Payer: COMMERCIAL

## 2019-12-06 DIAGNOSIS — IMO0001: ICD-10-CM

## 2019-12-06 PROCEDURE — 94729 DIFFUSING CAPACITY: CPT | Performed by: INTERNAL MEDICINE

## 2019-12-06 PROCEDURE — 94726 PLETHYSMOGRAPHY LUNG VOLUMES: CPT

## 2019-12-06 PROCEDURE — 94760 N-INVAS EAR/PLS OXIMETRY 1: CPT

## 2019-12-06 PROCEDURE — 94729 DIFFUSING CAPACITY: CPT

## 2019-12-06 PROCEDURE — 94726 PLETHYSMOGRAPHY LUNG VOLUMES: CPT | Performed by: INTERNAL MEDICINE

## 2019-12-06 PROCEDURE — 94060 EVALUATION OF WHEEZING: CPT | Performed by: INTERNAL MEDICINE

## 2019-12-06 PROCEDURE — 94060 EVALUATION OF WHEEZING: CPT

## 2019-12-06 RX ORDER — ALBUTEROL SULFATE 2.5 MG/3ML
2.5 SOLUTION RESPIRATORY (INHALATION) ONCE
Status: COMPLETED | OUTPATIENT
Start: 2019-12-06 | End: 2019-12-06

## 2019-12-06 RX ADMIN — ALBUTEROL SULFATE 2.5 MG: 2.5 SOLUTION RESPIRATORY (INHALATION) at 16:39

## 2019-12-30 ENCOUNTER — HOSPITAL ENCOUNTER (OUTPATIENT)
Dept: CT IMAGING | Facility: HOSPITAL | Age: 39
Discharge: HOME | End: 2019-12-30
Attending: INTERNAL MEDICINE
Payer: COMMERCIAL

## 2019-12-30 DIAGNOSIS — R91.8 MULTIPLE LUNG NODULES ON CT: ICD-10-CM

## 2019-12-30 PROCEDURE — 71250 CT THORAX DX C-: CPT

## 2020-01-03 ENCOUNTER — OFFICE VISIT (OUTPATIENT)
Dept: PULMONOLOGY | Facility: CLINIC | Age: 40
End: 2020-01-03
Payer: COMMERCIAL

## 2020-01-03 VITALS
TEMPERATURE: 98.2 F | SYSTOLIC BLOOD PRESSURE: 130 MMHG | WEIGHT: 211 LBS | HEIGHT: 71 IN | HEART RATE: 102 BPM | BODY MASS INDEX: 29.54 KG/M2 | DIASTOLIC BLOOD PRESSURE: 88 MMHG | OXYGEN SATURATION: 97 %

## 2020-01-03 DIAGNOSIS — IMO0001: ICD-10-CM

## 2020-01-03 DIAGNOSIS — J45.40 MODERATE PERSISTENT ASTHMA WITHOUT COMPLICATION: ICD-10-CM

## 2020-01-03 DIAGNOSIS — R09.82 POST-NASAL DRIP: ICD-10-CM

## 2020-01-03 DIAGNOSIS — J98.01 BRONCHOSPASM: ICD-10-CM

## 2020-01-03 DIAGNOSIS — R91.8 MULTIPLE LUNG NODULES ON CT: Primary | ICD-10-CM

## 2020-01-03 PROCEDURE — 99215 OFFICE O/P EST HI 40 MIN: CPT | Performed by: INTERNAL MEDICINE

## 2020-01-03 RX ORDER — ALBUTEROL SULFATE 90 UG/1
2 AEROSOL, METERED RESPIRATORY (INHALATION) EVERY 4 HOURS PRN
Qty: 1 INHALER | Refills: 3 | Status: SHIPPED | OUTPATIENT
Start: 2020-01-03

## 2020-01-03 NOTE — ASSESSMENT & PLAN NOTE
Based on recent pulmonary function testing, she does have underlying obstruction and seems to be responding favorably to [facility]  Will continue with Breo once daily  I also renewed her prescription for the rescue inhaler  Once she has been off steroids for at least 2 weeks, I would like to check CBC with differential and [facility] allergy panel to evaluate for underlying allergic component to her asthma  Interestingly, she states that her mother developed asthma around the same age

## 2020-01-03 NOTE — ASSESSMENT & PLAN NOTE
Most recent chest CT shows overall stability of the pulmonary nodules  Await official radiology interpretation  My plan would be to repeat CT in 6 months

## 2020-01-03 NOTE — ASSESSMENT & PLAN NOTE
I encouraged her to take Allegra   Or Zyrtec once daily to help with postnasal drip    Alternatively, she could also try using Flonase

## 2020-01-03 NOTE — PROGRESS NOTES
Pulmonary Follow Up Note   Heidi Pizarro 35 y o  female MRN: [id number]  1/3/2020      Assessment/Plan: Moderate persistent asthma without complication    Based on recent pulmonary function testing, she does have underlying obstruction and seems to be responding favorably to [facility]  Will continue with Breo once daily  I also renewed her prescription for the rescue inhaler  Once she has been off steroids for at least 2 weeks, I would like to check CBC with differential and [facility] allergy panel to evaluate for underlying allergic component to her asthma  Interestingly, she states that her mother developed asthma around the same age  Multiple lung nodules on CT    Most recent chest CT shows overall stability of the pulmonary nodules  Await official radiology interpretation  My plan would be to repeat CT in 6 months  Post-nasal drip    I encouraged her to take Allegra   Or Zyrtec once daily to help with postnasal drip  Alternatively, she could also try using Flonase      Visit orders:    Diagnoses and all orders for this visit:    Multiple lung nodules on CT  -     CT chest without contrast; Future    Moderate persistent asthma without complication  -     [facility] Allergy Panel, Adult; Future  -     CBC and differential; Future    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[facility])    Bronchospasm  -     albuterol (PROVENTIL HFA,VENTOLIN HFA) 90 mcg/act inhaler; Inhale 2 puffs every 4 (four) hours as needed for wheezing    Post-nasal drip        No follow-ups on file  History of Present Illness   HPI:  Heidi Pizarro is a 35 y o  female who is here today for follow-up regarding pulmonary nodules, asthma and Naveed's granulomatosis  She successfully weaned off of prednisone approximately 1 week ago  She did not notice any change in her pulmonary symptoms months coming off prednisone  She is using Breo Ellipta once daily and has not needed a rescue inhaler    Her shortness of breath has significantly improved  She has an occasional cough, most notable in the mornings  Sputum is usually clear in color  She has not been using any nasal sprays or taken any antihistamines  She denies chest pain or chest tightness  She denies wheezing  She has no fevers, chills or sweats  Review of Systems   Constitutional: Negative for chills, fever and unexpected weight change  HENT: Positive for postnasal drip  Negative for sore throat  Eyes: Negative for visual disturbance  Respiratory:        As noted in HPI   Cardiovascular: Negative for chest pain  Gastrointestinal: Negative for abdominal pain, diarrhea and vomiting  Genitourinary: Negative for difficulty urinating  Skin: Negative for rash  Neurological: Negative for headaches  Hematological: Negative for adenopathy  Psychiatric/Behavioral: Negative  All other systems reviewed and are negative  Historical Information   Past Medical History:   Diagnosis Date    Multiple lung nodules on CT 8/16/2019    Shortness of breath 8/16/2019    Wegener's granulomatosis ([facility] [address] )      History reviewed  No pertinent surgical history  No family history on file      Social History     Tobacco Use   Smoking Status Never Smoker   Smokeless Tobacco Never Used         Meds/Allergies     Current Outpatient Medications:     albuterol (PROVENTIL HFA,VENTOLIN HFA) 90 mcg/act inhaler, Inhale 2 puffs every 4 (four) hours as needed for wheezing, Disp: 1 Inhaler, Rfl: 3    cetirizine (ZyrTEC) 10 mg tablet, Take 1 tablet (10 mg total) by mouth daily, Disp: 30 tablet, Rfl: 0    fluconazole (DIFLUCAN) 150 mg tablet, take 1 tablet by mouth ONCE FOR 1 DAY, Disp: , Rfl: 0    fluticasone (FLONASE) 50 mcg/act nasal spray, 2 sprays into each nostril daily, Disp: 1 Bottle, Rfl: 2    fluticasone-vilanterol (BREO ELLIPTA) 200-25 MCG/INH inhaler, Inhale 1 puff daily Rinse mouth after use , Disp: 1 Inhaler, Rfl: 5    Prenatal Vit-Fe Fumarate-FA (RA PRENATAL) 28-0 8 MG TABS, Take 1 tablet by mouth daily, Disp: , Rfl: 0    valACYclovir (VALTREX) 500 mg tablet, , Disp: , Rfl: 0  Allergies   Allergen Reactions    Ibuprofen Hives    Other      seasonal       Vitals: Blood pressure 130/88, pulse 102, temperature 98 2 °F (36 8 °C), temperature source Tympanic, height 5' 11" (1 803 m), weight 95 7 kg (211 lb), SpO2 97 %  Body mass index is 29 43 kg/m²  Oxygen Therapy  SpO2: 97 %  Oxygen Therapy: None (Room air)    Physical Exam   Constitutional: She is oriented to person, place, and time  No distress  HENT:   Head: Normocephalic  Mouth/Throat: No oropharyngeal exudate  Eyes: Pupils are equal, round, and reactive to light  No scleral icterus  Neck: Neck supple  No JVD present  Cardiovascular: Normal rate and regular rhythm  No murmur heard  Pulmonary/Chest: She has no wheezes  She has no rales  Abdominal: Soft  There is no tenderness  Musculoskeletal: She exhibits no edema  Lymphadenopathy:     She has no cervical adenopathy  Neurological: She is alert and oriented to person, place, and time  Skin: Skin is warm and dry  Psychiatric: She has a normal mood and affect  Imaging and other studies: I have personally reviewed pertinent reports  and I have personally reviewed pertinent films in PACS   I have reviewed chest CT from 8/11/19 and 12/30/19  The most recent scan has not been read by Radiology  On my evaluation, the previously noted right lower lobe ground-glass opacities appear overall stable  Pulmonary function testing:  Performed   12/6/19   FEV1/FVC ratio 65%   FEV1 74% predicted  FVC 94% predicted  borderline response to bronchodilators  TLC 71 % predicted  RV 56 % predicted  DLCO corrected for hemoglobin 76 % predicted  PFT show mild obstruction with borderline bronchodilator response  There is mild restriction on Lung volumes    Diffusion capacity is normal

## 2020-01-20 ENCOUNTER — TRANSCRIBE ORDERS (OUTPATIENT)
Dept: LAB | Facility: CLINIC | Age: 40
End: 2020-01-20

## 2020-01-20 ENCOUNTER — APPOINTMENT (OUTPATIENT)
Dept: LAB | Facility: CLINIC | Age: 40
End: 2020-01-20
Payer: COMMERCIAL

## 2020-01-20 DIAGNOSIS — J45.40 MODERATE PERSISTENT ASTHMA WITHOUT COMPLICATION: ICD-10-CM

## 2020-01-20 LAB
BASOPHILS # BLD AUTO: 0.06 THOUSANDS/ΜL (ref 0–0.1)
BASOPHILS NFR BLD AUTO: 1 % (ref 0–1)
EOSINOPHIL # BLD AUTO: 0.19 THOUSAND/ΜL (ref 0–0.61)
EOSINOPHIL NFR BLD AUTO: 3 % (ref 0–6)
ERYTHROCYTE [DISTWIDTH] IN BLOOD BY AUTOMATED COUNT: 13.7 % (ref 11.6–15.1)
HCT VFR BLD AUTO: 41.5 % (ref 34.8–46.1)
HGB BLD-MCNC: 13.4 G/DL (ref 11.5–15.4)
IMM GRANULOCYTES # BLD AUTO: 0.02 THOUSAND/UL (ref 0–0.2)
IMM GRANULOCYTES NFR BLD AUTO: 0 % (ref 0–2)
LYMPHOCYTES # BLD AUTO: 3.19 THOUSANDS/ΜL (ref 0.6–4.47)
LYMPHOCYTES NFR BLD AUTO: 41 % (ref 14–44)
MCH RBC QN AUTO: 28.9 PG (ref 26.8–34.3)
MCHC RBC AUTO-ENTMCNC: 32.3 G/DL (ref 31.4–37.4)
MCV RBC AUTO: 89 FL (ref 82–98)
MONOCYTES # BLD AUTO: 0.43 THOUSAND/ΜL (ref 0.17–1.22)
MONOCYTES NFR BLD AUTO: 6 % (ref 4–12)
NEUTROPHILS # BLD AUTO: 3.81 THOUSANDS/ΜL (ref 1.85–7.62)
NEUTS SEG NFR BLD AUTO: 49 % (ref 43–75)
NRBC BLD AUTO-RTO: 0 /100 WBCS
PLATELET # BLD AUTO: 479 THOUSANDS/UL (ref 149–390)
PMV BLD AUTO: 9.3 FL (ref 8.9–12.7)
RBC # BLD AUTO: 4.64 MILLION/UL (ref 3.81–5.12)
WBC # BLD AUTO: 7.7 THOUSAND/UL (ref 4.31–10.16)

## 2020-01-20 PROCEDURE — 86003 ALLG SPEC IGE CRUDE XTRC EA: CPT

## 2020-01-20 PROCEDURE — 36415 COLL VENOUS BLD VENIPUNCTURE: CPT

## 2020-01-20 PROCEDURE — 85025 COMPLETE CBC W/AUTO DIFF WBC: CPT

## 2020-01-20 PROCEDURE — 82785 ASSAY OF IGE: CPT

## 2020-01-22 LAB
A ALTERNATA IGE QN: <0.1 KUA/I
A FUMIGATUS IGE QN: <0.1 KUA/I
ALLERGEN COMMENT: ABNORMAL
BERMUDA GRASS IGE QN: <0.1 KUA/I
BOXELDER IGE QN: <0.1 KUA/I
C HERBARUM IGE QN: <0.1 KUA/I
CAT DANDER IGE QN: <0.1 KUA/I
CMN PIGWEED IGE QN: <0.1 KUA/I
COMMON RAGWEED IGE QN: <0.1 KUA/I
COTTONWOOD IGE QN: <0.1 KUA/I
D FARINAE IGE QN: 17.3 KUA/I
D PTERONYSS IGE QN: 14.4 KUA/I
DOG DANDER IGE QN: 3.65 KUA/I
LONDON PLANE IGE QN: <0.1 KUA/I
MOUSE URINE PROT IGE QN: <0.1 KUA/I
MT JUNIPER IGE QN: <0.1 KUA/I
MUGWORT IGE QN: <0.1 KUA/I
P NOTATUM IGE QN: <0.1 KUA/I
ROACH IGE QN: 0.15 KUA/I
SHEEP SORREL IGE QN: <0.1 KUA/I
SILVER BIRCH IGE QN: <0.1 KUA/I
TIMOTHY IGE QN: <0.1 KUA/I
TOTAL IGE SMQN RAST: 179 KU/L (ref 0–113)
WALNUT IGE QN: <0.1 KUA/I
WHITE ASH IGE QN: <0.1 KUA/I
WHITE ELM IGE QN: <0.1 KUA/I
WHITE MULBERRY IGE QN: <0.1 KUA/I
WHITE OAK IGE QN: <0.1 KUA/I

## 2020-01-24 ENCOUNTER — TELEPHONE (OUTPATIENT)
Dept: PULMONOLOGY | Facility: CLINIC | Age: 40
End: 2020-01-24

## 2020-01-24 NOTE — TELEPHONE ENCOUNTER
Pt calling back for Dr Donavan Mcarthur regarging the results of her lab test:l would like a call back

## 2020-01-28 NOTE — TELEPHONE ENCOUNTER
Called left voicemail for patient that [facility] allergy panel did show increased sensitivities to dog dander, cockroach, D  Pteronyssinus, and D  Farinae    I did explain that her IgE was elevated and she could be a candidate for faserna vs Xolair      - advised patient that this will be discussed at further appointment

## 2020-03-09 ENCOUNTER — HOSPITAL ENCOUNTER (OUTPATIENT)
Dept: CT IMAGING | Facility: HOSPITAL | Age: 40
Discharge: HOME | End: 2020-03-09
Attending: INTERNAL MEDICINE
Payer: COMMERCIAL

## 2020-03-09 DIAGNOSIS — R91.8 MULTIPLE LUNG NODULES ON CT: ICD-10-CM

## 2020-03-09 PROCEDURE — 71250 CT THORAX DX C-: CPT

## 2020-03-13 DIAGNOSIS — R91.8 MULTIPLE LUNG NODULES ON CT: Primary | ICD-10-CM

## 2020-03-13 NOTE — PROGRESS NOTES
Chest CT results discussed with patient  Pulmonary nodules are overall stable  We will plan to repeat in 6 months

## 2020-05-28 ENCOUNTER — OFFICE VISIT (OUTPATIENT)
Dept: OBGYN CLINIC | Facility: CLINIC | Age: 40
End: 2020-05-28
Payer: COMMERCIAL

## 2020-05-28 VITALS
HEIGHT: 71 IN | WEIGHT: 210 LBS | SYSTOLIC BLOOD PRESSURE: 110 MMHG | DIASTOLIC BLOOD PRESSURE: 60 MMHG | BODY MASS INDEX: 29.4 KG/M2

## 2020-05-28 DIAGNOSIS — N94.6 DYSMENORRHEA: ICD-10-CM

## 2020-05-28 DIAGNOSIS — Z01.419 ENCNTR FOR GYN EXAM (GENERAL) (ROUTINE) W/O ABN FINDINGS: Primary | ICD-10-CM

## 2020-05-28 DIAGNOSIS — Z12.39 SCREENING FOR BREAST CANCER: ICD-10-CM

## 2020-05-28 DIAGNOSIS — B37.3 YEAST VAGINITIS: ICD-10-CM

## 2020-05-28 DIAGNOSIS — Z12.4 SCREENING FOR CERVICAL CANCER: ICD-10-CM

## 2020-05-28 DIAGNOSIS — B00.9 HERPES: ICD-10-CM

## 2020-05-28 PROCEDURE — 99386 PREV VISIT NEW AGE 40-64: CPT | Performed by: OBSTETRICS & GYNECOLOGY

## 2020-05-28 PROCEDURE — 87624 HPV HI-RISK TYP POOLED RSLT: CPT | Performed by: OBSTETRICS & GYNECOLOGY

## 2020-05-28 PROCEDURE — G0145 SCR C/V CYTO,THINLAYER,RESCR: HCPCS | Performed by: OBSTETRICS & GYNECOLOGY

## 2020-05-28 RX ORDER — FLUCONAZOLE 150 MG/1
150 TABLET ORAL
Qty: 2 TABLET | Refills: 6 | Status: SHIPPED | OUTPATIENT
Start: 2020-05-28 | End: 2020-05-30

## 2020-05-28 RX ORDER — VALACYCLOVIR HYDROCHLORIDE 500 MG/1
500 TABLET, FILM COATED ORAL DAILY
Qty: 90 TABLET | Refills: 3 | Status: SHIPPED | OUTPATIENT
Start: 2020-05-28 | End: 2021-08-06

## 2020-05-28 RX ORDER — NAPROXEN 500 MG/1
500 TABLET ORAL 2 TIMES DAILY WITH MEALS
Qty: 30 TABLET | Refills: 1 | Status: SHIPPED | OUTPATIENT
Start: 2020-05-28 | End: 2021-08-06

## 2020-05-29 LAB
HPV HR 12 DNA CVX QL NAA+PROBE: NEGATIVE
HPV16 DNA CVX QL NAA+PROBE: NEGATIVE
HPV18 DNA CVX QL NAA+PROBE: NEGATIVE

## 2020-06-05 LAB
LAB AP GYN PRIMARY INTERPRETATION: NORMAL
Lab: NORMAL

## 2020-06-28 ENCOUNTER — HOSPITAL ENCOUNTER (OUTPATIENT)
Dept: MAMMOGRAPHY | Facility: HOSPITAL | Age: 40
Discharge: HOME | End: 2020-06-28
Attending: OBSTETRICS & GYNECOLOGY
Payer: COMMERCIAL

## 2020-06-28 VITALS — WEIGHT: 202 LBS | BODY MASS INDEX: 28.28 KG/M2 | HEIGHT: 71 IN

## 2020-06-28 DIAGNOSIS — Z12.39 SCREENING FOR BREAST CANCER: ICD-10-CM

## 2020-06-28 PROCEDURE — 77063 BREAST TOMOSYNTHESIS BI: CPT

## 2020-06-28 PROCEDURE — 77067 SCR MAMMO BI INCL CAD: CPT

## 2020-09-14 ENCOUNTER — HOSPITAL ENCOUNTER (OUTPATIENT)
Dept: CT IMAGING | Facility: HOSPITAL | Age: 40
Discharge: HOME | End: 2020-09-14
Attending: INTERNAL MEDICINE
Payer: COMMERCIAL

## 2020-09-14 DIAGNOSIS — R91.8 MULTIPLE LUNG NODULES ON CT: ICD-10-CM

## 2020-09-14 PROCEDURE — 71250 CT THORAX DX C-: CPT

## 2020-09-17 ENCOUNTER — TELEPHONE (OUTPATIENT)
Dept: PULMONOLOGY | Facility: CLINIC | Age: 40
End: 2020-09-17

## 2020-09-17 ENCOUNTER — TELEPHONE (OUTPATIENT)
Dept: OTHER | Facility: HOSPITAL | Age: 40
End: 2020-09-17

## 2020-09-17 DIAGNOSIS — R91.1 PULMONARY NODULE: Primary | ICD-10-CM

## 2020-09-17 NOTE — TELEPHONE ENCOUNTER
Telephone note- Pulmonary Medicine   Jordana Major 35 y o  female MRN: [id number]    Reason for call:    Chest CT results      Pertinent details:    9/14/20    IMPRESSION:     Two groundglass nodular opacities in the right lower lobe  The more posterior nodule has increased in size since prior examination contains an increased number of small solid nodules  Both patient does have an underlying inflammatory condition, this   raises suspicion for bronchoalveolar adenocarcinoma  PET CT and/or direct tissue sampling is recommended  The more lateral groundglass nodule appears similar to the prior examination, and this may also be inflammatory in nature, but could also   represent a focus of bronchial alveolar adenocarcinoma  Imaging and plan discussed with Dr Andrey Cm    I spoke with Trinidad Boles and provided above results and plan to obtain PET-CT    I will have the office call Trinidad Boles to aid in scheduling    MARITA Castillo

## 2020-10-08 ENCOUNTER — HOSPITAL ENCOUNTER (OUTPATIENT)
Dept: RADIOLOGY | Age: 40
Discharge: HOME | End: 2020-10-08
Payer: COMMERCIAL

## 2020-10-08 ENCOUNTER — TELEPHONE (OUTPATIENT)
Dept: PULMONOLOGY | Facility: CLINIC | Age: 40
End: 2020-10-08

## 2020-10-08 DIAGNOSIS — R91.8 MULTIPLE LUNG NODULES ON CT: Primary | ICD-10-CM

## 2020-10-08 DIAGNOSIS — R91.1 PULMONARY NODULE: ICD-10-CM

## 2020-10-08 LAB — GLUCOSE SERPL-MCNC: 77 MG/DL (ref 65–140)

## 2020-10-08 PROCEDURE — 82948 REAGENT STRIP/BLOOD GLUCOSE: CPT

## 2020-10-08 PROCEDURE — G1004 CDSM NDSC: HCPCS

## 2020-10-08 PROCEDURE — 78815 PET IMAGE W/CT SKULL-THIGH: CPT

## 2020-10-08 PROCEDURE — A9552 F18 FDG: HCPCS

## 2020-11-23 ENCOUNTER — IMMUNIZATIONS (OUTPATIENT)
Dept: FAMILY MEDICINE CLINIC | Facility: CLINIC | Age: 40
End: 2020-11-23
Payer: COMMERCIAL

## 2020-11-23 DIAGNOSIS — Z23 NEED FOR VACCINATION: Primary | ICD-10-CM

## 2020-11-23 PROCEDURE — 90686 IIV4 VACC NO PRSV 0.5 ML IM: CPT

## 2020-11-23 PROCEDURE — 90471 IMMUNIZATION ADMIN: CPT

## 2020-12-22 PROBLEM — M31.30 GRANULOMATOSIS WITH POLYANGIITIS (HCC): Status: ACTIVE | Noted: 2020-12-22

## 2020-12-22 PROBLEM — J34.89 NASAL DRYNESS: Status: RESOLVED | Noted: 2020-12-22 | Resolved: 2020-12-22

## 2020-12-22 PROBLEM — R94.8 ABNORMAL POSITRON EMISSION TOMOGRAPHY (PET) SCAN: Status: ACTIVE | Noted: 2020-12-22

## 2020-12-22 PROBLEM — J34.89 SINUS PRESSURE: Status: ACTIVE | Noted: 2020-12-22

## 2020-12-22 PROBLEM — R91.1 PULMONARY NODULE: Status: ACTIVE | Noted: 2020-12-22

## 2020-12-22 PROBLEM — J34.89 NASAL DRYNESS: Status: ACTIVE | Noted: 2020-12-22

## 2021-01-26 PROBLEM — R09.82 POST-NASAL DRIP: Status: RESOLVED | Noted: 2019-08-16 | Resolved: 2021-01-26

## 2021-01-26 PROBLEM — J34.89 NASAL DRYNESS: Status: ACTIVE | Noted: 2021-01-26

## 2021-01-26 PROBLEM — J34.89 SINUS PRESSURE: Status: RESOLVED | Noted: 2020-12-22 | Resolved: 2021-01-26

## 2021-03-15 ENCOUNTER — TELEPHONE (OUTPATIENT)
Dept: PULMONOLOGY | Facility: CLINIC | Age: 41
End: 2021-03-15

## 2021-03-15 NOTE — TELEPHONE ENCOUNTER
Patients insurance company [employer] calling saying patients CT was denied  She states it says "only supported 6-12 months after negative resutls of a PET scan " She said she will call patient to update her and to have her call to r/s the CT  Should we need to contact [employer] to get CT approved, their phone number is [phone]

## 2021-03-25 DIAGNOSIS — R91.8 MULTIPLE LUNG NODULES ON CT: Primary | ICD-10-CM

## 2021-04-06 DIAGNOSIS — Z23 ENCOUNTER FOR IMMUNIZATION: ICD-10-CM

## 2021-04-15 ENCOUNTER — HOSPITAL ENCOUNTER (OUTPATIENT)
Dept: CT IMAGING | Facility: HOSPITAL | Age: 41
Discharge: HOME | End: 2021-04-15
Attending: INTERNAL MEDICINE
Payer: COMMERCIAL

## 2021-04-15 DIAGNOSIS — R91.8 MULTIPLE LUNG NODULES ON CT: ICD-10-CM

## 2021-04-15 PROCEDURE — G1004 CDSM NDSC: HCPCS

## 2021-04-15 PROCEDURE — 71250 CT THORAX DX C-: CPT

## 2021-04-26 ENCOUNTER — DOCUMENTATION (OUTPATIENT)
Dept: CARDIAC SURGERY | Facility: CLINIC | Age: 41
End: 2021-04-26

## 2021-04-26 NOTE — PROGRESS NOTES
THORACIC ONCOLOGY MULTIDISCIPLINARY CASE REVIEW    DATE:  4/26/2021    PRESENTING DOCTOR: Dr Loco Barnhart    DIAGNOSIS:  [place] granulomatosis  STAGING:     Jordana Major is a 38 y o  female who was presented at the Thoracic Oncology Multidisciplinary Tumor Conference today  PHYSICIAN RECOMMENDED PLAN:  6 month CTC, observe for change  With minimal solid component ground glass opacities not amenable to wedge resection  Imaging reviewed: 4/15/2021 CTC reviewed  Groundglass nodular densities without associated solid tissue in RLL slowly increasing in size  Pathology reviewed-NA    PFT's reviewed -NA    Future imaging:- 6 month CTC    Referrals: none    Procedures:none at this time  Team agreed to plan    NCCN guidelines were readily available for review at this discussion

## 2021-04-27 PROBLEM — J34.89 NASAL VESTIBULITIS: Status: ACTIVE | Noted: 2021-04-27

## 2021-04-30 ENCOUNTER — OFFICE VISIT (OUTPATIENT)
Dept: PULMONOLOGY | Facility: CLINIC | Age: 41
End: 2021-04-30
Payer: COMMERCIAL

## 2021-04-30 VITALS
HEART RATE: 102 BPM | HEIGHT: 71 IN | BODY MASS INDEX: 28.5 KG/M2 | TEMPERATURE: 97.9 F | SYSTOLIC BLOOD PRESSURE: 124 MMHG | WEIGHT: 203.6 LBS | OXYGEN SATURATION: 98 % | DIASTOLIC BLOOD PRESSURE: 76 MMHG

## 2021-04-30 DIAGNOSIS — R09.82 POST-NASAL DRIP: ICD-10-CM

## 2021-04-30 DIAGNOSIS — R91.8 MULTIPLE LUNG NODULES ON CT: Primary | ICD-10-CM

## 2021-04-30 DIAGNOSIS — R06.02 SHORTNESS OF BREATH: ICD-10-CM

## 2021-04-30 DIAGNOSIS — R91.1 PULMONARY NODULE: ICD-10-CM

## 2021-04-30 DIAGNOSIS — M31.30 WEGENER'S GRANULOMATOSIS WITHOUT RENAL INVOLVEMENT (HCC): ICD-10-CM

## 2021-04-30 DIAGNOSIS — J45.20 MILD INTERMITTENT ASTHMA WITHOUT COMPLICATION: ICD-10-CM

## 2021-04-30 PROCEDURE — 99214 OFFICE O/P EST MOD 30 MIN: CPT | Performed by: INTERNAL MEDICINE

## 2021-04-30 NOTE — PROGRESS NOTES
Pulmonary Follow Up Note   Jose Vaz 38 y o  female MRN: [id number]  4/30/2021    Assessment & Plan:    1  Mild intermittent asthma without acute exacerbation  - Patient stopped her inhaler therapy 1 year ago and has had no issues off of Breo  -  She has an as needed rescue inhaler but has not required frequent use  - UTD with flu vaccine  - received 1 dose of Moderna, will have second dose in few weeks    2  Pulmonary nodules: 2 ground-glass nodular opacities in the lateral right lower lobe, now measuring 2 6 X 1 1 cm and 2 1 X 1 1 cm in size, increased from prior  No FDG avidity on PET CT  - unclear etiology of these nodules  - suspect more likely inflammatory in setting of patients Granulomatosis with polyangiitis, but appear to be increasing in size  - no FDG avidity on PET CT  - discussed at tumor board and recommendation was made for repeat CT chest 6 months from now  - discussed possible bronchoscopy with patient - likely low yield but can attempt BAL and rule out [facility] - patient declined this and understands risk  - will proceed with repeat CT chest 6 months from prior     3  Granulomatosis with polyangiitis  - continued f/u with rheumatology, not currently on any therapy     4  Post nasal drip  - can c/w flonase and over the counter anti histamine as needed    5  Elevated IgE 179 with multiple allergens  - was reviewed with patient - can c/w prn anti histamine, given stable respiratory status no need to pursue any injectable therapy  She is actually off maintenance inhaler at this time       Diagnoses and all orders for this visit:    Multiple lung nodules on CT  -     CT chest without contrast; Future    Pulmonary nodule  -     CT chest without contrast; Future    Mild intermittent asthma without complication    Shortness of breath    Wegener's granulomatosis without renal involvement ([facility] [address] )    Post-nasal drip        Return in about 1 year (around 4/30/2022) for Next scheduled follow up      History of Present Illness      HPI:  Nelly Client is a 38 y o  female with moderate persistent asthma, multiple lung nodules on CT chest, postnasal drip,  Granulomatosis with polyangiitis (follows with rheumatology), who presents for follow-up  In terms of asthma, she was maintained on Breo 200/25  1 puff daily and as needed rescue inhaler  She was last seen in the office in January 2020 was doing relatively well  She initially had to ground-glass nodules within the right lower lobe in August 2019 larger measuring 11 X 8 mm  She then had a follow-up CT chest in December 2019 which were slightly increased in size  These were stable when imaged again in  March 2019, but did increase in size in September 2020  She then had a PET-CT which showed no FDG avid pulmonary lesions suspicious for malignancy and right lower lobe nodules were stable, with mostly ground-glass appearance, unclear if representation of granulomatosis with polyangiitis or slow-growing malignant etiology  She had most recent CT chest 04/15/2021, which again revealed 2 ground-glass nodular opacities in the lateral right lower lobe, now measuring 2 6 X 1 1 cm and 2 1 X 1 1 cm in size, increased from prior  Patient recently presented at thoracic oncology  Multidisciplinary case review and recommendation was made for 6 month CT chest, observe for change  With minimal solid component ground-glass opacities not amenable to wedge resection  Since last visit, patient states that she actually stopped all inhaler therapy and has rarely used her rescue inhaler over past year  Denies any episodes of shortness of breath or wheezing and feels well  No recent changes in weight, night sweats, fever, chills  No hemoptysis  She does have occasional episodes of epistaxis  She follows with Rheumatology for her granulomatosis with polyangiitis and is currently not on any therapy  She received 1st dose of COVID vaccine      Review of Systems Constitutional: Negative for chills, fever and unexpected weight change  HENT: Negative for congestion, rhinorrhea, sneezing and sore throat  Respiratory: Negative for cough, shortness of breath and wheezing  Cardiovascular: Negative for chest pain, palpitations and leg swelling  Gastrointestinal: Negative for abdominal pain, constipation, diarrhea, nausea and vomiting  Endocrine: Negative for cold intolerance and heat intolerance  Genitourinary: Negative for dysuria  Musculoskeletal: Negative for arthralgias  Allergic/Immunologic: Negative for immunocompromised state  Neurological: Negative for dizziness and numbness       Historical Information   Past Medical History:   Diagnosis Date    Asthma     Disease of thyroid gland     Multiple lung nodules on CT 8/16/2019    Shortness of breath 8/16/2019    Thyroid cyst     Wegener's granulomatosis     Wegener's granulomatosis      Past Surgical History:   Procedure Laterality Date    MOUTH SURGERY      US GUIDED THYROID BIOPSY  2014     Family History   Problem Relation Age of Onset    Hypertension Mother     No Known Problems Father     No Known Problems Brother     Stroke Maternal Grandmother     Dementia Maternal Grandfather     Diabetes Paternal Grandmother     Dementia Paternal Grandfather     No Known Problems Half-Sister     No Known Problems Half-Sister     No Known Problems Half-Sister     No Known Problems Maternal Aunt     No Known Problems Maternal Aunt     No Known Problems Maternal Aunt     No Known Problems Maternal Aunt     No Known Problems Maternal Aunt     No Known Problems Maternal Aunt     No Known Problems Paternal Aunt     No Known Problems Paternal Aunt     No Known Problems Paternal [de-identified]     Colon cancer Maternal Uncle     Breast cancer Cousin      Meds/Allergies     Current Outpatient Medications:     Biotin 10 MG CAPS, Take by mouth, Disp: , Rfl:     thiamine (VITAMIN B1) 100 mg tablet, Take 100 mg by mouth daily, Disp: , Rfl:     albuterol (PROVENTIL HFA,VENTOLIN HFA) 90 mcg/act inhaler, Inhale 2 puffs every 4 (four) hours as needed for wheezing (Patient not taking: Reported on 12/22/2020), Disp: 1 Inhaler, Rfl: 3    cetirizine (ZyrTEC) 10 mg tablet, Take 1 tablet (10 mg total) by mouth daily (Patient not taking: Reported on 12/22/2020), Disp: 30 tablet, Rfl: 0    fluticasone (FLONASE) 50 mcg/act nasal spray, 2 sprays into each nostril daily (Patient not taking: Reported on 12/22/2020), Disp: 1 Bottle, Rfl: 2    fluticasone-vilanterol (BREO ELLIPTA) 200-25 MCG/INH inhaler, Inhale 1 puff daily Rinse mouth after use  (Patient not taking: Reported on 12/22/2020), Disp: 1 Inhaler, Rfl: 5    glucosamine-chondroitin 500-400 MG tablet, Take 1 tablet by mouth 3 (three) times a day, Disp: , Rfl:     naproxen (NAPROSYN) 500 mg tablet, Take 1 tablet (500 mg total) by mouth 2 (two) times a day with meals (Patient not taking: Reported on 12/22/2020), Disp: 30 tablet, Rfl: 1    Prenatal Vit-Fe Fumarate-FA (RA PRENATAL) 28-0 8 MG TABS, Take 1 tablet by mouth daily, Disp: , Rfl: 0    valACYclovir (VALTREX) 500 mg tablet, Take 1 tablet (500 mg total) by mouth daily, Disp: 90 tablet, Rfl: 3  Allergies   Allergen Reactions    Ibuprofen Hives    Other      seasonal       Vitals: Blood pressure 124/76, pulse 102, temperature 97 9 °F (36 6 °C), temperature source Temporal, height 5' 11" (1 803 m), weight 92 4 kg (203 lb 9 6 oz), SpO2 98 %  Body mass index is 28 4 kg/m²  Oxygen Therapy  SpO2: 98 %  Oxygen Therapy: None (Room air)    Physical Exam  Constitutional:       General: She is not in acute distress  Appearance: She is well-developed  She is not diaphoretic  HENT:      Head: Normocephalic and atraumatic  Mouth/Throat:      Mouth: Mucous membranes are moist       Pharynx: Oropharynx is clear  No oropharyngeal exudate  Eyes:      General: No scleral icterus    Cardiovascular:      Rate and Rhythm: Normal rate and regular rhythm  Heart sounds: Normal heart sounds  No murmur  Pulmonary:      Effort: Pulmonary effort is normal  No respiratory distress  Breath sounds: Normal breath sounds  No wheezing  Abdominal:      General: Bowel sounds are normal  There is no distension  Palpations: Abdomen is soft  Tenderness: There is no abdominal tenderness  Musculoskeletal:      Right lower leg: No edema  Left lower leg: No edema  Neurological:      Mental Status: She is alert and oriented to person, place, and time  Labs: I have personally reviewed pertinent lab results  Lab Results   Component Value Date    WBC 7 70 2020    HGB 13 4 2020    HCT 41 5 2020    MCV 89 2020     (H) 2020     No results found for: GLUCOSE, CALCIUM, NA, K, CO2, CL, BUN, CREATININE  Lab Results   Component Value Date     (H) 2020     No results found for: ALT, AST, GGT, ALKPHOS, BILITOT     Imaging and other studies: I have personally reviewed pertinent reports  and I have personally reviewed pertinent films in PACS  CT chest 04/15/2021, which again revealed 2 ground-glass nodular opacities in the lateral right lower lobe, now measuring 2 6 X 1 1 cm and 2 1 X 1 1 cm in size, increased from prior  Pulmonary function testin/6/19  Results:  FEV1/FVC Ratio: 65 %  Forced Vital Capacity: 3 60 L    94 % predicted  FEV1: 2 33 L     74 % predicted     Lung volumes by body plethysmography:   Total Lung Capacity 71 % predicted   Residual volume 56 % predicted     DLCO corrected for patients hemoglobin level: 76 %     Interpretation:     · Mild obstructive airflow defect based on pre-bronchodilator testing      · No significant response to the administration to bronchodilator per ATS Standards     · Mild restrictive lung disease as indicated by decreased TLC     · Normal diffusion capacity     · Flow volume loop shows blunted expiratory limb      EKG, Pathology, and Other Studies: I have personally reviewed pertinent reports      No TTE for review      Katharine Cushing, MD  Pulmonary & Critical Care Fellow, [facility]

## 2021-06-10 ENCOUNTER — ANNUAL EXAM (OUTPATIENT)
Dept: OBGYN CLINIC | Facility: CLINIC | Age: 41
End: 2021-06-10
Payer: COMMERCIAL

## 2021-06-10 VITALS
DIASTOLIC BLOOD PRESSURE: 78 MMHG | HEIGHT: 71 IN | SYSTOLIC BLOOD PRESSURE: 120 MMHG | WEIGHT: 207.4 LBS | BODY MASS INDEX: 29.03 KG/M2

## 2021-06-10 DIAGNOSIS — Z01.411 ENCOUNTER FOR GYNECOLOGICAL EXAMINATION WITH ABNORMAL FINDING: Primary | ICD-10-CM

## 2021-06-10 DIAGNOSIS — Z12.31 ENCOUNTER FOR SCREENING MAMMOGRAM FOR BREAST CANCER: ICD-10-CM

## 2021-06-10 DIAGNOSIS — N94.10 DYSPAREUNIA IN FEMALE: ICD-10-CM

## 2021-06-10 PROBLEM — R09.82 POST-NASAL DRIP: Status: RESOLVED | Noted: 2019-08-16 | Resolved: 2021-06-10

## 2021-06-10 PROCEDURE — 3008F BODY MASS INDEX DOCD: CPT | Performed by: PHYSICIAN ASSISTANT

## 2021-06-10 PROCEDURE — 87624 HPV HI-RISK TYP POOLED RSLT: CPT | Performed by: PHYSICIAN ASSISTANT

## 2021-06-10 PROCEDURE — 99396 PREV VISIT EST AGE 40-64: CPT | Performed by: PHYSICIAN ASSISTANT

## 2021-06-10 PROCEDURE — 1036F TOBACCO NON-USER: CPT | Performed by: PHYSICIAN ASSISTANT

## 2021-06-10 PROCEDURE — G0145 SCR C/V CYTO,THINLAYER,RESCR: HCPCS | Performed by: PHYSICIAN ASSISTANT

## 2021-06-10 NOTE — PROGRESS NOTES
Assessment/Plan:    No problem-specific Assessment & Plan notes found for this encounter  Diagnoses and all orders for this visit:    Encounter for gynecological examination with abnormal finding  -     Liquid-based pap, screening    Encounter for screening mammogram for breast cancer  -     Mammo screening bilateral w 3d & cad; Future    Dyspareunia in female  -     US pelvis complete w transvaginal; Future        Pap done  Order for mammogram entered  Order for pelvic U/S entered to evaluate dyspareunia  Call is symptoms worsen or change  If no problems, patient to return in 1 year for routine gyn care  Subjective:      Patient ID: Gray Cruz is a 38 y o  female  Patient is here for yearly gyn exam   States she is doing well overall  Periods are regular every 28 days, and bleeding lasts for 5 days  She denies heavy bleeding, severe cramping, HA, and mood symptoms  Notes more back pain around ovulation over the last year  Also complains of pain during intercourse at mid-cycle  Last pelvic imaging was done through a specialist 2 years ago and was normal   Patient denies bowel/bladder changes, bloating, abdominal pain, n/v, change in appetite, and thyroid disease  Patient is due for mammogram   She is performing self-breast exam   Denies new masses, skin changes, nipple discharge, and pain/tenderness  The following portions of the patient's history were reviewed and updated as appropriate: allergies, current medications, past family history, past medical history, past social history, past surgical history and problem list     Review of Systems   Constitutional: Negative for appetite change and unexpected weight change  Cardiovascular:        No masses, skin changes, nipple discharge, and pain/tenderness  Gastrointestinal: Negative for abdominal distention, abdominal pain, constipation, diarrhea, nausea and vomiting  Genitourinary: Positive for dyspareunia   Negative for difficulty urinating, frequency, genital sores, hematuria, menstrual problem, pelvic pain, urgency, vaginal bleeding, vaginal discharge and vaginal pain  Objective:      /78 (BP Location: Left arm, Patient Position: Sitting, Cuff Size: Standard)   Ht 5' 11" (1 803 m)   Wt 94 1 kg (207 lb 6 4 oz)   LMP 05/28/2021   BMI 28 93 kg/m²          Physical Exam  Vitals signs reviewed  Exam conducted with a chaperone present  Constitutional:       Appearance: Normal appearance  She is well-developed  Neck:      Musculoskeletal: Neck supple  Thyroid: No thyromegaly  Pulmonary:      Effort: Pulmonary effort is normal    Chest:      Breasts: Breasts are symmetrical          Right: Normal  No swelling, bleeding, inverted nipple, mass, nipple discharge, skin change or tenderness  Left: Normal  No swelling, bleeding, inverted nipple, mass, nipple discharge, skin change or tenderness  Abdominal:      General: Abdomen is flat  There is no distension  Palpations: Abdomen is soft  Tenderness: There is no abdominal tenderness  Genitourinary:     General: Normal vulva  Pubic Area: No rash  Labia:         Right: No rash, tenderness, lesion or injury  Left: No rash, tenderness, lesion or injury  Vagina: Normal  No vaginal discharge, erythema, tenderness or bleeding  Cervix: Normal       Uterus: Normal        Adnexa: Right adnexa normal and left adnexa normal         Right: No mass, tenderness or fullness  Left: No mass, tenderness or fullness  Lymphadenopathy:      Cervical: No cervical adenopathy  Upper Body:      Right upper body: No supraclavicular or axillary adenopathy  Left upper body: No supraclavicular or axillary adenopathy  Lower Body: No right inguinal adenopathy  No left inguinal adenopathy  Skin:     General: Skin is warm and dry     Neurological:      Mental Status: She is alert and oriented to person, place, and time    Psychiatric:         Mood and Affect: Mood normal          Behavior: Behavior normal  Behavior is cooperative  Thought Content:  Thought content normal          Judgment: Judgment normal

## 2021-06-15 LAB
LAB AP GYN PRIMARY INTERPRETATION: NORMAL
Lab: NORMAL

## 2021-08-06 ENCOUNTER — OFFICE VISIT (OUTPATIENT)
Dept: FAMILY MEDICINE CLINIC | Facility: CLINIC | Age: 41
End: 2021-08-06
Payer: COMMERCIAL

## 2021-08-06 VITALS
HEART RATE: 104 BPM | RESPIRATION RATE: 18 BRPM | OXYGEN SATURATION: 98 % | TEMPERATURE: 98.8 F | BODY MASS INDEX: 28.28 KG/M2 | DIASTOLIC BLOOD PRESSURE: 90 MMHG | SYSTOLIC BLOOD PRESSURE: 130 MMHG | HEIGHT: 71 IN | WEIGHT: 202 LBS

## 2021-08-06 DIAGNOSIS — R05.9 COUGH: Primary | ICD-10-CM

## 2021-08-06 DIAGNOSIS — J32.9 SINUSITIS, UNSPECIFIED CHRONICITY, UNSPECIFIED LOCATION: ICD-10-CM

## 2021-08-06 PROCEDURE — 3725F SCREEN DEPRESSION PERFORMED: CPT | Performed by: FAMILY MEDICINE

## 2021-08-06 PROCEDURE — 99214 OFFICE O/P EST MOD 30 MIN: CPT | Performed by: FAMILY MEDICINE

## 2021-08-06 PROCEDURE — U0003 INFECTIOUS AGENT DETECTION BY NUCLEIC ACID (DNA OR RNA); SEVERE ACUTE RESPIRATORY SYNDROME CORONAVIRUS 2 (SARS-COV-2) (CORONAVIRUS DISEASE [COVID-19]), AMPLIFIED PROBE TECHNIQUE, MAKING USE OF HIGH THROUGHPUT TECHNOLOGIES AS DESCRIBED BY CMS-2020-01-R: HCPCS | Performed by: FAMILY MEDICINE

## 2021-08-06 PROCEDURE — 3008F BODY MASS INDEX DOCD: CPT | Performed by: FAMILY MEDICINE

## 2021-08-06 PROCEDURE — U0005 INFEC AGEN DETEC AMPLI PROBE: HCPCS | Performed by: FAMILY MEDICINE

## 2021-08-06 PROCEDURE — 1036F TOBACCO NON-USER: CPT | Performed by: FAMILY MEDICINE

## 2021-08-06 RX ORDER — MULTIVIT-MIN/IRON FUM/FOLIC AC 7.5 MG-4
1 TABLET ORAL DAILY
COMMUNITY

## 2021-08-06 RX ORDER — AMOXICILLIN AND CLAVULANATE POTASSIUM 875; 125 MG/1; MG/1
1 TABLET, FILM COATED ORAL EVERY 12 HOURS SCHEDULED
Qty: 20 TABLET | Refills: 0 | Status: SHIPPED | OUTPATIENT
Start: 2021-08-06 | End: 2021-08-16

## 2021-08-06 NOTE — PROGRESS NOTES
Assessment/Plan:    No problem-specific Assessment & Plan notes found for this encounter  Diagnoses and all orders for this visit:    Cough  -     Novel Coronavirus (COVID-19), PCR [facility] Collected in Office    Sinusitis, unspecified chronicity, unspecified location  -     amoxicillin-clavulanate (Augmentin) 875-125 mg per tablet; Take 1 tablet by mouth every 12 (twelve) hours for 10 days    Other orders  -     Multiple Vitamins-Minerals (multivitamin with minerals) tablet; Take 1 tablet by mouth daily          Subjective:      Patient ID: Jonathan Ashby is a 38 y o  female with mild intermittent asthma, pulmonary nodules, wegener's granulomatosis here today for complaint of URI sx for the last 11 days  Started out with sore throat, harsh cough  Had some clear rhinorrhea initially  Now with purulent nasal secretions and some sinus pressure  Cough improved since sx began  She has felt a little short of breath throughout the illness  Minimal wheezing  Has not used rescue inhaler  No fever  Taking nyquil, sudafed and using vicks  She has received both covid vaccines  No known covid exposure  Had just gone back to office after being home for the last year  HPI    The following portions of the patient's history were reviewed and updated as appropriate:   She  has a past medical history of Asthma, Disease of thyroid gland, Multiple lung nodules on CT (8/16/2019), Shortness of breath (8/16/2019), Thyroid cyst, Wegener's granulomatosis, and Wegener's granulomatosis  She  has a past surgical history that includes US guided thyroid biopsy (2014) and Mouth surgery  She  reports that she has never smoked  She has never used smokeless tobacco  She reports current alcohol use  She reports that she does not use drugs    Current Outpatient Medications on File Prior to Visit   Medication Sig    albuterol (PROVENTIL HFA,VENTOLIN HFA) 90 mcg/act inhaler Inhale 2 puffs every 4 (four) hours as needed for wheezing    Biotin 10 MG CAPS Take by mouth    glucosamine-chondroitin 500-400 MG tablet Take 1 tablet by mouth 3 (three) times a day    Multiple Vitamins-Minerals (multivitamin with minerals) tablet Take 1 tablet by mouth daily    valACYclovir (VALTREX) 500 mg tablet Take 1 tablet (500 mg total) by mouth daily (Patient taking differently: Take 500 mg by mouth as needed )    [DISCONTINUED] cetirizine (ZyrTEC) 10 mg tablet Take 1 tablet (10 mg total) by mouth daily (Patient not taking: Reported on 8/6/2021)    [DISCONTINUED] fluticasone (FLONASE) 50 mcg/act nasal spray 2 sprays into each nostril daily (Patient not taking: Reported on 12/22/2020)    [DISCONTINUED] fluticasone-vilanterol (BREO ELLIPTA) 200-25 MCG/INH inhaler Inhale 1 puff daily Rinse mouth after use  (Patient not taking: Reported on 8/6/2021)    [DISCONTINUED] naproxen (NAPROSYN) 500 mg tablet Take 1 tablet (500 mg total) by mouth 2 (two) times a day with meals (Patient not taking: Reported on 12/22/2020)    [DISCONTINUED] Prenatal Vit-Fe Fumarate-FA (RA PRENATAL) 28-0 8 MG TABS Take 1 tablet by mouth daily (Patient not taking: Reported on 8/6/2021)    [DISCONTINUED] thiamine (VITAMIN B1) 100 mg tablet Take 100 mg by mouth daily (Patient not taking: Reported on 8/6/2021)     No current facility-administered medications on file prior to visit  She is allergic to ibuprofen and other       Review of Systems      Objective:      /90 (BP Location: Left arm, Patient Position: Sitting, Cuff Size: Large)   Pulse 104   Temp 98 8 °F (37 1 °C) (Temporal)   Resp 18   Ht 5' 11" (1 803 m)   Wt 91 6 kg (202 lb)   SpO2 98%   BMI 28 17 kg/m²          Physical Exam

## 2021-08-07 LAB — SARS-COV-2 RNA RESP QL NAA+PROBE: NEGATIVE

## 2021-08-30 PROBLEM — Z00.00 ANNUAL PHYSICAL EXAM: Status: ACTIVE | Noted: 2021-08-30

## 2021-08-30 PROBLEM — D50.9 IRON DEFICIENCY ANEMIA: Status: ACTIVE | Noted: 2021-08-30

## 2021-08-30 NOTE — PROGRESS NOTES
Assessment/Plan:         Problem List Items Addressed This Visit        Cardiovascular and Mediastinum    Wegener's granulomatosis without renal involvement ([address] )     Pt followed by dr Melissa Calderon            Other    Annual physical exam - Primary     Routine labs         Relevant Orders    CBC and differential    Lipid panel    Iron deficiency anemia     Repeat cbc and iron studies         Relevant Orders    Iron Panel (Includes Ferritin, Iron Sat%, Iron, and TIBC)      Other Visit Diagnoses     Need for hepatitis C screening test        Relevant Orders    Hepatitis C Antibody ([facility])    Immunization due        Relevant Orders    influenza vaccine, quadrivalent, recombinant, PF, 0 5 mL, for patients 18 yr+ ([facility])            Subjective: pt here for annual physical has wegeners and anemia     Patient ID: Eleuterio Bach is a 38 y o  female  HPI    The following portions of the patient's history were reviewed and updated as appropriate:   Past Medical History:  She has a past medical history of Asthma, Disease of thyroid gland, Multiple lung nodules on CT (8/16/2019), Shortness of breath (8/16/2019), Thyroid cyst, Wegener's granulomatosis, and Wegener's granulomatosis  ,  _______________________________________________________________________  Medical Problems:  does not have any pertinent problems on file ,  _______________________________________________________________________  Past Surgical History:   has a past surgical history that includes US guided thyroid biopsy (2014) and Mouth surgery  ,  _______________________________________________________________________  Family History:  family history includes Breast cancer in her cousin; Colon cancer in her maternal uncle; Dementia in her maternal grandfather and paternal grandfather; Diabetes in her paternal grandmother; Hypertension in her mother; No Known Problems in her brother, father, half-sister, half-sister, half-sister, maternal aunt, maternal aunt, maternal aunt, maternal aunt, maternal aunt, maternal aunt, paternal aunt, paternal aunt, and paternal aunt; Stroke in her maternal grandmother ,  _______________________________________________________________________  Social History:   reports that she has never smoked  She has never used smokeless tobacco  She reports current alcohol use  She reports that she does not use drugs  ,  _______________________________________________________________________  Allergies:  is allergic to ibuprofen and other     _______________________________________________________________________  Current Outpatient Medications   Medication Sig Dispense Refill    albuterol (PROVENTIL HFA,VENTOLIN HFA) 90 mcg/act inhaler Inhale 2 puffs every 4 (four) hours as needed for wheezing 1 Inhaler 3    Biotin 10 MG CAPS Take by mouth      glucosamine-chondroitin 500-400 MG tablet Take 1 tablet by mouth 3 (three) times a day      Multiple Vitamins-Minerals (multivitamin with minerals) tablet Take 1 tablet by mouth daily      valACYclovir (VALTREX) 500 mg tablet Take 1 tablet (500 mg total) by mouth daily (Patient taking differently: Take 500 mg by mouth as needed ) 90 tablet 3     No current facility-administered medications for this visit      _______________________________________________________________________  Review of Systems   Constitutional: Negative for appetite change, chills, fatigue and fever  Respiratory: Negative for cough, chest tightness and shortness of breath  Cardiovascular: Negative for chest pain, palpitations and leg swelling  Gastrointestinal: Negative for abdominal pain, constipation, diarrhea, nausea and vomiting  Genitourinary: Negative for difficulty urinating and frequency  Musculoskeletal: Negative for arthralgias, back pain and neck pain  Skin: Negative for rash  Neurological: Negative for dizziness, weakness, light-headedness, numbness and headaches     Hematological: Does not bruise/bleed easily  Psychiatric/Behavioral: Negative for dysphoric mood and sleep disturbance  The patient is not nervous/anxious  Objective:  Vitals:    09/01/21 1359   BP: 118/70   BP Location: Left arm   Patient Position: Sitting   Pulse: 80   Resp: 16   Temp: 98 6 °F (37 °C)   SpO2: 97%   Weight: 92 5 kg (204 lb)   Height: 5' 9 5" (1 765 m)     Body mass index is 29 69 kg/m²  Physical Exam  Vitals reviewed  Constitutional:       General: She is not in acute distress  Appearance: Normal appearance  She is well-developed  She is not ill-appearing  HENT:      Head: Normocephalic  Right Ear: Tympanic membrane, ear canal and external ear normal       Left Ear: Tympanic membrane, ear canal and external ear normal       Nose: Nose normal       Mouth/Throat:      Mouth: Mucous membranes are moist       Pharynx: No oropharyngeal exudate  Eyes:      General: Lids are normal  No scleral icterus  Extraocular Movements: Extraocular movements intact  Conjunctiva/sclera: Conjunctivae normal       Pupils: Pupils are equal, round, and reactive to light  Neck:      Thyroid: No thyromegaly  Vascular: No carotid bruit  Cardiovascular:      Rate and Rhythm: Normal rate and regular rhythm  Pulses: Normal pulses  Heart sounds: Normal heart sounds  No murmur heard  No friction rub  Pulmonary:      Effort: Pulmonary effort is normal       Breath sounds: Normal breath sounds  No wheezing, rhonchi or rales  Abdominal:      General: Bowel sounds are normal  There is no distension  Palpations: Abdomen is soft  There is no mass  Tenderness: There is no abdominal tenderness  There is no guarding  Hernia: No hernia is present  Musculoskeletal:         General: Normal range of motion  Cervical back: Normal range of motion and neck supple  Lymphadenopathy:      Cervical: No cervical adenopathy  Skin:     General: Skin is warm and dry  Findings: No rash  Comments: No abnormal appearing moles   Neurological:      General: No focal deficit present  Mental Status: She is alert and oriented to person, place, and time  Mental status is at baseline  Cranial Nerves: No cranial nerve deficit  Sensory: No sensory deficit  Motor: No weakness, tremor or abnormal muscle tone  Coordination: Coordination normal       Gait: Gait normal       Deep Tendon Reflexes: Reflexes normal    Psychiatric:         Mood and Affect: Mood normal          Speech: Speech normal          Behavior: Behavior normal        BMI Counseling: Body mass index is 29 69 kg/m²  The BMI is above normal  Nutrition recommendations include 3-5 servings of fruits/vegetables daily, reducing fast food intake, consuming healthier snacks, decreasing soda and/or juice intake, moderation in carbohydrate intake and increasing intake of lean protein  Exercise recommendations include exercising 3-5 times per week and strength training exercises

## 2021-09-01 ENCOUNTER — OFFICE VISIT (OUTPATIENT)
Dept: FAMILY MEDICINE CLINIC | Facility: CLINIC | Age: 41
End: 2021-09-01
Payer: COMMERCIAL

## 2021-09-01 VITALS
SYSTOLIC BLOOD PRESSURE: 118 MMHG | HEIGHT: 70 IN | OXYGEN SATURATION: 97 % | BODY MASS INDEX: 29.2 KG/M2 | DIASTOLIC BLOOD PRESSURE: 70 MMHG | RESPIRATION RATE: 16 BRPM | HEART RATE: 80 BPM | TEMPERATURE: 98.6 F | WEIGHT: 204 LBS

## 2021-09-01 DIAGNOSIS — M31.30 WEGENER'S GRANULOMATOSIS WITHOUT RENAL INVOLVEMENT (HCC): ICD-10-CM

## 2021-09-01 DIAGNOSIS — Z00.00 ANNUAL PHYSICAL EXAM: Primary | ICD-10-CM

## 2021-09-01 DIAGNOSIS — Z11.59 NEED FOR HEPATITIS C SCREENING TEST: ICD-10-CM

## 2021-09-01 DIAGNOSIS — Z23 IMMUNIZATION DUE: ICD-10-CM

## 2021-09-01 DIAGNOSIS — D50.9 IRON DEFICIENCY ANEMIA, UNSPECIFIED IRON DEFICIENCY ANEMIA TYPE: ICD-10-CM

## 2021-09-01 PROCEDURE — 3008F BODY MASS INDEX DOCD: CPT | Performed by: FAMILY MEDICINE

## 2021-09-01 PROCEDURE — 90686 IIV4 VACC NO PRSV 0.5 ML IM: CPT | Performed by: FAMILY MEDICINE

## 2021-09-01 PROCEDURE — 90471 IMMUNIZATION ADMIN: CPT | Performed by: FAMILY MEDICINE

## 2021-09-01 PROCEDURE — 1036F TOBACCO NON-USER: CPT | Performed by: FAMILY MEDICINE

## 2021-09-01 PROCEDURE — 99396 PREV VISIT EST AGE 40-64: CPT | Performed by: FAMILY MEDICINE

## 2021-09-03 LAB
BASOPHILS # BLD AUTO: 38 CELLS/UL (ref 0–200)
BASOPHILS NFR BLD AUTO: 0.5 %
CHOLEST SERPL-MCNC: 187 MG/DL
CHOLEST/HDLC SERPL: 4.9 (CALC)
EOSINOPHIL # BLD AUTO: 198 CELLS/UL (ref 15–500)
EOSINOPHIL NFR BLD AUTO: 2.6 %
ERYTHROCYTE [DISTWIDTH] IN BLOOD BY AUTOMATED COUNT: 14.2 % (ref 11–15)
FERRITIN SERPL-MCNC: 25 NG/ML (ref 16–232)
HCT VFR BLD AUTO: 38.3 % (ref 35–45)
HCV AB S/CO SERPL IA: 0.29
HCV AB SERPL QL IA: NORMAL
HDLC SERPL-MCNC: 38 MG/DL
HGB BLD-MCNC: 12.6 G/DL (ref 11.7–15.5)
IRON SATN MFR SERPL: 15 % (CALC) (ref 16–45)
IRON SERPL-MCNC: 56 MCG/DL (ref 40–190)
LDLC SERPL CALC-MCNC: 126 MG/DL (CALC)
LYMPHOCYTES # BLD AUTO: 2637 CELLS/UL (ref 850–3900)
LYMPHOCYTES NFR BLD AUTO: 34.7 %
MCH RBC QN AUTO: 28.6 PG (ref 27–33)
MCHC RBC AUTO-ENTMCNC: 32.9 G/DL (ref 32–36)
MCV RBC AUTO: 86.8 FL (ref 80–100)
MONOCYTES # BLD AUTO: 350 CELLS/UL (ref 200–950)
MONOCYTES NFR BLD AUTO: 4.6 %
NEUTROPHILS # BLD AUTO: 4378 CELLS/UL (ref 1500–7800)
NEUTROPHILS NFR BLD AUTO: 57.6 %
NONHDLC SERPL-MCNC: 149 MG/DL (CALC)
PLATELET # BLD AUTO: 409 THOUSAND/UL (ref 140–400)
PMV BLD REES-ECKER: 9.8 FL (ref 7.5–12.5)
RBC # BLD AUTO: 4.41 MILLION/UL (ref 3.8–5.1)
TIBC SERPL-MCNC: 371 MCG/DL (CALC) (ref 250–450)
TRIGL SERPL-MCNC: 119 MG/DL
WBC # BLD AUTO: 7.6 THOUSAND/UL (ref 3.8–10.8)

## 2021-09-08 DIAGNOSIS — N76.0 ACUTE VAGINITIS: Primary | ICD-10-CM

## 2021-09-08 RX ORDER — FLUCONAZOLE 150 MG/1
150 TABLET ORAL
Qty: 2 TABLET | Refills: 0 | Status: SHIPPED | OUTPATIENT
Start: 2021-09-08 | End: 2021-09-12

## 2021-09-09 ENCOUNTER — OFFICE VISIT (OUTPATIENT)
Dept: OBGYN CLINIC | Facility: CLINIC | Age: 41
End: 2021-09-09
Payer: COMMERCIAL

## 2021-09-09 VITALS
BODY MASS INDEX: 27.52 KG/M2 | SYSTOLIC BLOOD PRESSURE: 120 MMHG | HEIGHT: 70 IN | DIASTOLIC BLOOD PRESSURE: 74 MMHG | WEIGHT: 192.2 LBS

## 2021-09-09 DIAGNOSIS — Z30.011 ENCOUNTER FOR INITIAL PRESCRIPTION OF CONTRACEPTIVE PILLS: Primary | ICD-10-CM

## 2021-09-09 DIAGNOSIS — E78.00 ELEVATED CHOLESTEROL: Primary | ICD-10-CM

## 2021-09-09 PROBLEM — Z00.00 ANNUAL PHYSICAL EXAM: Status: RESOLVED | Noted: 2021-08-30 | Resolved: 2021-09-09

## 2021-09-09 PROCEDURE — 99213 OFFICE O/P EST LOW 20 MIN: CPT | Performed by: PHYSICIAN ASSISTANT

## 2021-09-09 PROCEDURE — 3008F BODY MASS INDEX DOCD: CPT | Performed by: PHYSICIAN ASSISTANT

## 2021-09-09 PROCEDURE — 1036F TOBACCO NON-USER: CPT | Performed by: PHYSICIAN ASSISTANT

## 2021-09-09 RX ORDER — NORGESTIMATE AND ETHINYL ESTRADIOL 7DAYSX3 LO
1 KIT ORAL DAILY
Qty: 28 TABLET | Refills: 3 | Status: SHIPPED | OUTPATIENT
Start: 2021-09-09 | End: 2021-12-09 | Stop reason: SDUPTHER

## 2021-09-09 NOTE — PATIENT INSTRUCTIONS
Rx for Ortho Tri-Cyclen Lo x 3 refills sent to pharmacy  Start pill first day of next period  Take medication every day at the same time; do not skip doses  Call with problems

## 2021-09-09 NOTE — PROGRESS NOTES
Assessment/Plan:    No problem-specific Assessment & Plan notes found for this encounter  Diagnoses and all orders for this visit:    Encounter for initial prescription of contraceptive pills  -     norgestimate-ethinyl estradiol (ORTHO TRI-CYCLEN LO) 0 18/0 215/0 25 MG-25 MCG per tablet; Take 1 tablet by mouth daily        Discussed options for contraception - OCP vs IUD  Patient would like to go back on OCP  Rx for Ortho Tri-Cyclen Lo x 3 refills sent to pharmacy  Patient can start medication first day of next period  Take pill every day at the same time; do not skip doses  F/u in 3 months for recheck  Call with problems in the interim  Subjective:      Patient ID: Elias Rasmussen is a 38 y o  female  Patient is here to discuss starting hormonal contraception  States she is doing well overall  Periods are regular once a month, and bleeding lasts for 6 days  She denies heavy bleeding and severe cramping  Currently on iron supplements for anemia  States pelvic pain has resolved since yearly exam in June  Did not go for pelvic U/S  She is sexually active with her   Denies bowel/bladder changes, bloating, abdominal pain, n/v, change in appetite, and thyroid disease  Patient followed by rheumatology for Wegener's granulomatosis  Will be restarting methotrexate for flare and needs to be on contraception while on medication to prevent pregnancy  States she has used the patch and Ortho Tri-Cyclen in the past   Would prefer an OCP  She is a non-smoker  No history of migraine with aura or blood clots  The following portions of the patient's history were reviewed and updated as appropriate: allergies, current medications, past family history, past medical history, past social history, past surgical history and problem list     Review of Systems   Constitutional: Negative for appetite change and unexpected weight change     Gastrointestinal: Negative for abdominal distention, abdominal pain, constipation, diarrhea, nausea and vomiting  Genitourinary: Negative for difficulty urinating, dysuria, frequency, genital sores, hematuria, menstrual problem, pelvic pain, urgency, vaginal bleeding, vaginal discharge and vaginal pain  Objective:      /74 (BP Location: Left arm, Patient Position: Sitting, Cuff Size: Standard)   Ht 5' 9 5" (1 765 m)   Wt 87 2 kg (192 lb 3 2 oz)   LMP 08/16/2021   BMI 27 98 kg/m²          Physical Exam  Vitals reviewed  Constitutional:       Appearance: Normal appearance  She is well-developed  Neurological:      Mental Status: She is alert and oriented to person, place, and time  Psychiatric:         Mood and Affect: Mood normal          Behavior: Behavior normal  Behavior is cooperative  Thought Content:  Thought content normal          Judgment: Judgment normal

## 2021-10-05 ENCOUNTER — TELEPHONE (OUTPATIENT)
Dept: FAMILY MEDICINE CLINIC | Facility: CLINIC | Age: 41
End: 2021-10-05

## 2021-10-05 DIAGNOSIS — Z20.822 EXPOSURE TO COVID-19 VIRUS: Primary | ICD-10-CM

## 2021-10-05 PROCEDURE — U0003 INFECTIOUS AGENT DETECTION BY NUCLEIC ACID (DNA OR RNA); SEVERE ACUTE RESPIRATORY SYNDROME CORONAVIRUS 2 (SARS-COV-2) (CORONAVIRUS DISEASE [COVID-19]), AMPLIFIED PROBE TECHNIQUE, MAKING USE OF HIGH THROUGHPUT TECHNOLOGIES AS DESCRIBED BY CMS-2020-01-R: HCPCS | Performed by: FAMILY MEDICINE

## 2021-10-05 PROCEDURE — U0005 INFEC AGEN DETEC AMPLI PROBE: HCPCS | Performed by: FAMILY MEDICINE

## 2021-10-19 ENCOUNTER — HOSPITAL ENCOUNTER (OUTPATIENT)
Dept: RADIOLOGY | Facility: HOSPITAL | Age: 41
Discharge: HOME | End: 2021-10-19
Payer: COMMERCIAL

## 2021-10-19 ENCOUNTER — TELEMEDICINE (OUTPATIENT)
Dept: FAMILY MEDICINE CLINIC | Facility: CLINIC | Age: 41
End: 2021-10-19
Payer: COMMERCIAL

## 2021-10-19 VITALS — BODY MASS INDEX: 28.06 KG/M2 | HEIGHT: 70 IN | WEIGHT: 196 LBS

## 2021-10-19 DIAGNOSIS — J20.9 ACUTE LARYNGOTRACHEOBRONCHITIS: ICD-10-CM

## 2021-10-19 DIAGNOSIS — M31.30 WEGENER'S GRANULOMATOSIS WITHOUT RENAL INVOLVEMENT (HCC): ICD-10-CM

## 2021-10-19 DIAGNOSIS — R06.02 SHORTNESS OF BREATH: ICD-10-CM

## 2021-10-19 DIAGNOSIS — R06.02 SHORTNESS OF BREATH: Primary | ICD-10-CM

## 2021-10-19 PROCEDURE — 3008F BODY MASS INDEX DOCD: CPT | Performed by: FAMILY MEDICINE

## 2021-10-19 PROCEDURE — 1036F TOBACCO NON-USER: CPT | Performed by: FAMILY MEDICINE

## 2021-10-19 PROCEDURE — 71046 X-RAY EXAM CHEST 2 VIEWS: CPT

## 2021-10-19 PROCEDURE — 99214 OFFICE O/P EST MOD 30 MIN: CPT | Performed by: FAMILY MEDICINE

## 2021-10-19 RX ORDER — FOLIC ACID 1 MG/1
1000 TABLET ORAL DAILY
COMMUNITY
Start: 2021-10-08

## 2021-10-19 RX ORDER — PREDNISONE 20 MG/1
20 TABLET ORAL 2 TIMES DAILY WITH MEALS
Qty: 10 TABLET | Refills: 0 | Status: SHIPPED | OUTPATIENT
Start: 2021-10-19 | End: 2021-10-24

## 2021-10-19 RX ORDER — METHOTREXATE 15 MG/1
15 TABLET, FILM COATED ORAL
COMMUNITY
Start: 2021-10-08

## 2021-10-19 RX ORDER — AZITHROMYCIN 250 MG/1
TABLET, FILM COATED ORAL
Qty: 6 TABLET | Refills: 0 | Status: SHIPPED | OUTPATIENT
Start: 2021-10-19 | End: 2021-10-24

## 2021-10-19 RX ORDER — DEXAMETHASONE 4 MG/1
2 TABLET ORAL 2 TIMES DAILY
Qty: 12 G | Refills: 0 | Status: SHIPPED | OUTPATIENT
Start: 2021-10-19

## 2021-10-22 ENCOUNTER — TELEPHONE (OUTPATIENT)
Dept: PULMONOLOGY | Facility: CLINIC | Age: 41
End: 2021-10-22

## 2021-10-27 ENCOUNTER — HOSPITAL ENCOUNTER (OUTPATIENT)
Dept: RADIOLOGY | Age: 41
Discharge: HOME | End: 2021-10-27
Payer: COMMERCIAL

## 2021-10-27 VITALS — BODY MASS INDEX: 27.77 KG/M2 | HEIGHT: 70 IN | WEIGHT: 194 LBS

## 2021-10-27 DIAGNOSIS — Z12.31 ENCOUNTER FOR SCREENING MAMMOGRAM FOR BREAST CANCER: ICD-10-CM

## 2021-10-27 PROCEDURE — 77067 SCR MAMMO BI INCL CAD: CPT

## 2021-10-27 PROCEDURE — 77063 BREAST TOMOSYNTHESIS BI: CPT

## 2021-11-30 PROBLEM — R05.1 ACUTE COUGH: Status: ACTIVE | Noted: 2021-11-30

## 2021-12-09 ENCOUNTER — OFFICE VISIT (OUTPATIENT)
Dept: OBGYN CLINIC | Facility: CLINIC | Age: 41
End: 2021-12-09
Payer: COMMERCIAL

## 2021-12-09 VITALS
BODY MASS INDEX: 28.63 KG/M2 | DIASTOLIC BLOOD PRESSURE: 80 MMHG | SYSTOLIC BLOOD PRESSURE: 122 MMHG | HEIGHT: 70 IN | WEIGHT: 200 LBS

## 2021-12-09 DIAGNOSIS — Z30.9 ENCOUNTER FOR CONTRACEPTIVE MANAGEMENT, UNSPECIFIED TYPE: ICD-10-CM

## 2021-12-09 DIAGNOSIS — Z30.41 ENCOUNTER FOR SURVEILLANCE OF CONTRACEPTIVE PILLS: Primary | ICD-10-CM

## 2021-12-09 PROCEDURE — 99213 OFFICE O/P EST LOW 20 MIN: CPT | Performed by: PHYSICIAN ASSISTANT

## 2021-12-09 RX ORDER — NORGESTIMATE AND ETHINYL ESTRADIOL 7DAYSX3 LO
1 KIT ORAL DAILY
Qty: 84 TABLET | Refills: 1 | Status: SHIPPED | OUTPATIENT
Start: 2021-12-09 | End: 2022-04-28

## 2021-12-20 ENCOUNTER — IMMUNIZATIONS (OUTPATIENT)
Dept: FAMILY MEDICINE CLINIC | Facility: HOSPITAL | Age: 41
End: 2021-12-20

## 2021-12-20 DIAGNOSIS — Z23 ENCOUNTER FOR IMMUNIZATION: Primary | ICD-10-CM

## 2021-12-20 PROCEDURE — 0001A COVID-19 PFIZER VACC 0.3 ML: CPT

## 2021-12-20 PROCEDURE — 91300 COVID-19 PFIZER VACC 0.3 ML: CPT

## 2022-02-25 ENCOUNTER — TELEPHONE (OUTPATIENT)
Dept: PULMONOLOGY | Facility: CLINIC | Age: 42
End: 2022-02-25

## 2022-02-25 NOTE — TELEPHONE ENCOUNTER
Spoke with Dr Shady Arzate regarding this patient  She is having increased cough, wheeze and shortness of breath  She has not been using [facility] that had provided significant benefit from an asthma standpoint in the past   This is further complicated by her known history of Naveed's granulomatosis with questionable pulmonary involvement  Patient was overdue for a repeat CT, but has not yet gotten it because of concern for radiation exposure  I suggested that we were at least resume the [facility]  If her symptoms improve, no further workup needed in the immediate future  However, if no significant improvement, she would need updated PFTs and CT of the chest   I will have the office call the patient on Monday to schedule a follow-up office visit

## 2022-03-14 RX ORDER — METHOTREXATE 5 MG/1
TABLET, FILM COATED ORAL
COMMUNITY
Start: 2022-01-27

## 2022-03-15 ENCOUNTER — OFFICE VISIT (OUTPATIENT)
Dept: PULMONOLOGY | Facility: CLINIC | Age: 42
End: 2022-03-15
Payer: COMMERCIAL

## 2022-03-15 VITALS
WEIGHT: 193.4 LBS | SYSTOLIC BLOOD PRESSURE: 130 MMHG | TEMPERATURE: 100.2 F | HEIGHT: 70 IN | BODY MASS INDEX: 27.69 KG/M2 | DIASTOLIC BLOOD PRESSURE: 78 MMHG | RESPIRATION RATE: 18 BRPM | OXYGEN SATURATION: 98 % | HEART RATE: 122 BPM

## 2022-03-15 DIAGNOSIS — R91.8 MULTIPLE LUNG NODULES ON CT: ICD-10-CM

## 2022-03-15 DIAGNOSIS — R05.1 ACUTE COUGH: ICD-10-CM

## 2022-03-15 DIAGNOSIS — G44.89 OTHER HEADACHE SYNDROME: ICD-10-CM

## 2022-03-15 DIAGNOSIS — J40 BRONCHITIS: ICD-10-CM

## 2022-03-15 DIAGNOSIS — R06.02 SHORTNESS OF BREATH: ICD-10-CM

## 2022-03-15 DIAGNOSIS — R91.1 PULMONARY NODULE: ICD-10-CM

## 2022-03-15 DIAGNOSIS — R06.1 STRIDOR: ICD-10-CM

## 2022-03-15 DIAGNOSIS — M31.30 WEGENER'S GRANULOMATOSIS WITHOUT RENAL INVOLVEMENT (HCC): Primary | ICD-10-CM

## 2022-03-15 DIAGNOSIS — J45.20 MILD INTERMITTENT ASTHMA WITHOUT COMPLICATION: ICD-10-CM

## 2022-03-15 PROCEDURE — 1036F TOBACCO NON-USER: CPT | Performed by: INTERNAL MEDICINE

## 2022-03-15 PROCEDURE — 99215 OFFICE O/P EST HI 40 MIN: CPT | Performed by: INTERNAL MEDICINE

## 2022-03-15 PROCEDURE — 3008F BODY MASS INDEX DOCD: CPT | Performed by: INTERNAL MEDICINE

## 2022-03-15 PROCEDURE — 94010 BREATHING CAPACITY TEST: CPT | Performed by: INTERNAL MEDICINE

## 2022-03-15 RX ORDER — PREDNISONE 10 MG/1
30 TABLET ORAL DAILY
Qty: 90 TABLET | Refills: 2 | Status: SHIPPED | OUTPATIENT
Start: 2022-03-15

## 2022-03-15 NOTE — ASSESSMENT & PLAN NOTE
Patient has progressively worsening shortness of breath which is likely multifactorial   Her most notable finding on exam today is stridor with spirometry suggestive of fixed airway obstruction  She does have known underlying asthma, though I hear no wheezing on exam today  See workup as below  Once she has had CT scan done, we will resume prednisone 30 mg daily to see if this helps with her symptoms

## 2022-03-15 NOTE — ASSESSMENT & PLAN NOTE
She recently completed a course of antibiotics    We will check sputum cultures and chest imaging before treating with any further antibiotics

## 2022-03-15 NOTE — ASSESSMENT & PLAN NOTE
Patient has persistent headaches, without associated neurologic findings  Headache is most likely related to respiratory distress and difficulty breathing  However, given significant decline in her overall clinical status, we will also perform CT of the head

## 2022-03-15 NOTE — ASSESSMENT & PLAN NOTE
Patient will undergo CT of the neck for further evaluation  I have also reached out to Dr Rema Arenas from ENT to requested the patient be seen this week for repeat laryngoscopy  I suggested that she resume the Pepcid and Prilosec to optimize any potential for reflux contributing to upper airway edema    Certainly with her history of Naveed's, she could have laryngeal involvement as well

## 2022-03-15 NOTE — ASSESSMENT & PLAN NOTE
She will undergo repeat CT to re-evaluate the pulmonary nodules  If there are new nodules or a more solid component to the existing nodules, may need to consider tissue sampling

## 2022-03-15 NOTE — LETTER
March 15, 2022     Fredi Treadwell, [address]  [address]  [address] [place] [address]    Patient: Jordana Major   YOB: 1980   Date of Visit: 3/15/2022       Dear Dr Tio Doll: Thank you for referring Brittani Lindquist to me for evaluation  Below are my notes for this consultation  If you have questions, please do not hesitate to call me  I look forward to following your patient along with you  Sincerely,        Iggy Maldonado DO        CC: MD Sina May MD Waldon Greener, DO  3/15/2022 12:44 PM  Sign when Signing Visit  Pulmonary Follow Up Note   Jordana Major 42 y o  female MRN: [id number]  3/15/2022      Assessment/Plan:     Shortness of breath  Patient has progressively worsening shortness of breath which is likely multifactorial   Her most notable finding on exam today is stridor with spirometry suggestive of fixed airway obstruction  She does have known underlying asthma, though I hear no wheezing on exam today  See workup as below  Once she has had CT scan done, we will resume prednisone 30 mg daily to see if this helps with her symptoms  Stridor  Patient will undergo CT of the neck for further evaluation  I have also reached out to Dr Ambrosio Mazariegos from ENT to requested the patient be seen this week for repeat laryngoscopy  I suggested that she resume the Pepcid and Prilosec to optimize any potential for reflux contributing to upper airway edema  Certainly with her history of Naveed's, she could have laryngeal involvement as well    Multiple lung nodules on CT  She will undergo repeat CT to re-evaluate the pulmonary nodules  If there are new nodules or a more solid component to the existing nodules, may need to consider tissue sampling  Mild intermittent asthma without complication  She has a given diagnosis of asthma, however I do not think that is the predominant issue here    She is using Flovent and albuterol without much relief  Other headache syndrome  Patient has persistent headaches, without associated neurologic findings  Headache is most likely related to respiratory distress and difficulty breathing  However, given significant decline in her overall clinical status, we will also perform CT of the head  Acute cough  She recently completed a course of antibiotics  We will check sputum cultures and chest imaging before treating with any further antibiotics      Visit orders:    Diagnoses and all orders for this visit:    Wegener's granulomatosis without renal involvement ([facility] [address] )  -     CT chest without contrast; Future  -     predniSONE 10 mg tablet; Take 3 tablets (30 mg total) by mouth daily    Pulmonary nodule  -     CT chest without contrast; Future    Mild intermittent asthma without complication    Other headache syndrome  -     CT head wo contrast; Future    Stridor  -     CT soft tissue neck w wo contrast; Future  -     POCT spirometry    Bronchitis  -     Sputum culture and Gram stain; Future  -     Sputum culture and Gram stain    Shortness of breath    Multiple lung nodules on CT    Acute cough    Other orders  -     Trexall 5 MG tablet        No follow-ups on file  History of Present Illness   HPI:  Manuel Glover is a 42 y o  female who is here today for follow-up regarding worsening shortness of breath  Patient has a longstanding history of Naveed's granulomatosis with pulmonary nodules, felt to possibly related to underlying vasculitis  However, we have been unable to biopsy the nodules given their peer ground-glass nature  She was last seen in my office in April 2021  At that point, she was not having any significant shortness of breath or wheezing and had actually stopped taking her inhalers  She reports that approximately 3 or 4 months ago, she developed worsening shortness of breaths and noisy breathing    She was seen by ENT in November and laryngoscopy showed tonsillar enlargement, inflamed lymphoid tissue and edema within the glottis at the level of the cords  Subsequent follow-up in December  She was prescribed Prilosec, Pepcid and prednisone taper  Patient admits to not having any symptoms of heartburn and has not been taking any of the reflux medications  Patient has progressive symptoms to the point of being breathless after speaking only 3 words  She has very noisy breathing with upper airway stridor  She complains of persistent headache and occasional visual changes with floaters  No arm or leg weakness  She also has difficulty expectorating sputum  When she is able to bring up the sputum is rust colored  She recently completed a 10 day course of antibiotics  She has no fevers but chronically suffers from chills  Her symptoms have been extremely distressing  She is unable to sleep at night  She finds it difficult to work during the day  Review of Systems  per HPI    Otherwise negative    Medical, Family and Social history reviewed and updated as appropriate    Historical Information   Past Medical History:   Diagnosis Date    Asthma     Disease of thyroid gland     Multiple lung nodules on CT 8/16/2019    Shortness of breath 8/16/2019    Thyroid cyst     Wegener's granulomatosis     Wegener's granulomatosis      Past Surgical History:   Procedure Laterality Date    MOUTH SURGERY      US GUIDED FINE NEEDLE ASPIRATION ([employer])  4/10/2014    US GUIDED THYROID BIOPSY  2014     Family History   Problem Relation Age of Onset    Hypertension Mother     No Known Problems Father     No Known Problems Brother     Stroke Maternal Grandmother     Dementia Maternal Grandfather     Diabetes Paternal Grandmother     Dementia Paternal Grandfather     No Known Problems Half-Sister     No Known Problems Half-Sister     No Known Problems Half-Sister     No Known Problems Maternal Aunt     No Known Problems Maternal Aunt     No Known Problems Maternal Aunt     No Known Problems Maternal Aunt     No Known Problems Maternal Aunt     No Known Problems Maternal Aunt     No Known Problems Paternal Aunt     No Known Problems Paternal Aunt     No Known Problems Paternal [de-identified]     Colon cancer Maternal Uncle 58    Breast cancer Cousin 34    BRCA 1/2 Cousin         unknown result       Social History     Tobacco Use   Smoking Status Never Smoker   Smokeless Tobacco Never Used         Meds/Allergies     Current Outpatient Medications:     albuterol (PROVENTIL HFA,VENTOLIN HFA) 90 mcg/act inhaler, Inhale 2 puffs every 4 (four) hours as needed for wheezing, Disp: 1 Inhaler, Rfl: 3    Biotin 10 MG CAPS, Take by mouth, Disp: , Rfl:     famotidine (PEPCID) 40 MG tablet, Take 1 tablet (40 mg total) by mouth daily at bedtime, Disp: 90 tablet, Rfl: 3    fluticasone (Flovent HFA) 110 MCG/ACT inhaler, Inhale 2 puffs 2 (two) times a day Rinse mouth after use , Disp: 12 g, Rfl: 0    folic acid (FOLVITE) 1 mg tablet, Take 1,000 mcg by mouth daily, Disp: , Rfl:     glucosamine-chondroitin 500-400 MG tablet, Take 1 tablet by mouth 3 (three) times a day, Disp: , Rfl:     Multiple Vitamins-Minerals (multivitamin with minerals) tablet, Take 1 tablet by mouth daily, Disp: , Rfl:     norgestimate-ethinyl estradiol (ORTHO TRI-CYCLEN LO) 0 18/0 215/0 25 MG-25 MCG per tablet, Take 1 tablet by mouth daily, Disp: 84 tablet, Rfl: 1    omeprazole (PriLOSEC) 40 MG capsule, Take 1 capsule (40 mg total) by mouth daily Take 30 minutes before breakfast , Disp: 90 capsule, Rfl: 3    predniSONE 10 mg tablet, Take take 40 mg daily for 3 days  Then take 30 mg daily for 3 days   Then as instructed , Disp: 60 tablet, Rfl: 0    Trexall 15 MG tablet, Take 15 mg by mouth weekly before breakfast, Disp: , Rfl:     Trexall 5 MG tablet, , Disp: , Rfl:     predniSONE 10 mg tablet, Take 3 tablets (30 mg total) by mouth daily, Disp: 90 tablet, Rfl: 2    valACYclovir (VALTREX) 500 mg tablet, Take 1 tablet (500 mg total) by mouth daily (Patient taking differently: Take 500 mg by mouth as needed ), Disp: 90 tablet, Rfl: 3  Allergies   Allergen Reactions    Ibuprofen Hives    Other Eye Swelling     seasonal       Vitals: Blood pressure 130/78, pulse (!) 122, temperature 100 2 °F (37 9 °C), temperature source Tympanic, resp  rate 18, height 5' 9 5" (1 765 m), weight 87 7 kg (193 lb 6 4 oz), SpO2 98 %, not currently breastfeeding  Body mass index is 28 15 kg/m²  Oxygen Therapy  SpO2: 98 %  Oxygen Therapy: None (Room air)    Physical Exam   Physical Exam  Constitutional:       General: She is not in acute distress  HENT:      Head: Normocephalic  Mouth/Throat:      Pharynx: No oropharyngeal exudate  Eyes:      General: No scleral icterus  Pupils: Pupils are equal, round, and reactive to light  Neck:      Vascular: No JVD  Comments: Audible stridor  Cardiovascular:      Rate and Rhythm: Normal rate and regular rhythm  Pulmonary:      Breath sounds: No wheezing, rhonchi or rales  Comments: Poor air entry  Abdominal:      Palpations: Abdomen is soft  Tenderness: There is no abdominal tenderness  Musculoskeletal:      Cervical back: Neck supple  Lymphadenopathy:      Cervical: No cervical adenopathy  Skin:     General: Skin is warm and dry  Neurological:      Mental Status: She is alert and oriented to person, place, and time  Psychiatric:         Mood and Affect: Mood normal          Labs: I have personally reviewed pertinent lab results  Lab Results   Component Value Date    WBC 7 6 09/02/2021    HGB 12 6 09/02/2021    HCT 38 3 09/02/2021    MCV 86 8 09/02/2021     (H) 09/02/2021     No results found for: GLUCOSE, CALCIUM, NA, K, CO2, CL, BUN, CREATININE  Lab Results   Component Value Date     (H) 01/20/2020     No results found for: ALT, AST, GGT, ALKPHOS, BILITOT    Imaging and other studies: I have personally reviewed pertinent reports     and I have personally reviewed pertinent films in PACS chest CT from 4/15/21 shows 2 ground-glass nodular opacities in the right lower lobe  They had shown interval increase in size, but without any solid component    Pulmonary function testing:  Performed 2/6/19 and personally interpreted  FEV1/FVC ratio 65%   FEV1 74% predicted  FVC 94% predicted  No response to bronchodilators  TLC 71 % predicted  RV 56 % predicted  DLCO corrected for hemoglobin 76 % predicted  Spirometry was performed in the office today  FEV1/FVC ratio is 35% with an FEV1 of 30% of predicted and FVC of 70% of predicted  Flow volume loop is of poor quality but shows definite flattening of the expiratory limb    Inspiratory limb not well recorded

## 2022-03-15 NOTE — PROGRESS NOTES
Pulmonary Follow Up Note   Annette Winters 42 y o  female MRN: [id number]  3/15/2022      Assessment/Plan:     Shortness of breath  Patient has progressively worsening shortness of breath which is likely multifactorial   Her most notable finding on exam today is stridor with spirometry suggestive of fixed airway obstruction  She does have known underlying asthma, though I hear no wheezing on exam today  See workup as below  Once she has had CT scan done, we will resume prednisone 30 mg daily to see if this helps with her symptoms  Stridor  Patient will undergo CT of the neck for further evaluation  I have also reached out to Dr Melvin Pickard from ENT to requested the patient be seen this week for repeat laryngoscopy  I suggested that she resume the Pepcid and Prilosec to optimize any potential for reflux contributing to upper airway edema  Certainly with her history of Naveed's, she could have laryngeal involvement as well    Multiple lung nodules on CT  She will undergo repeat CT to re-evaluate the pulmonary nodules  If there are new nodules or a more solid component to the existing nodules, may need to consider tissue sampling  Mild intermittent asthma without complication  She has a given diagnosis of asthma, however I do not think that is the predominant issue here  She is using Flovent and albuterol without much relief  Other headache syndrome  Patient has persistent headaches, without associated neurologic findings  Headache is most likely related to respiratory distress and difficulty breathing  However, given significant decline in her overall clinical status, we will also perform CT of the head  Acute cough  She recently completed a course of antibiotics    We will check sputum cultures and chest imaging before treating with any further antibiotics      Visit orders:    Diagnoses and all orders for this visit:    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 [address] )  -     CT chest without contrast; Future  -     predniSONE 10 mg tablet; Take 3 tablets (30 mg total) by mouth daily    Pulmonary nodule  -     CT chest without contrast; Future    Mild intermittent asthma without complication    Other headache syndrome  -     CT head wo contrast; Future    Stridor  -     CT soft tissue neck w wo contrast; Future  -     POCT spirometry    Bronchitis  -     Sputum culture and Gram stain; Future  -     Sputum culture and Gram stain    Shortness of breath    Multiple lung nodules on CT    Acute cough    Other orders  -     Trexall 5 MG tablet        No follow-ups on file  History of Present Illness   HPI:  Lynn Bowen is a 42 y o  female who is here today for follow-up regarding worsening shortness of breath  Patient has a longstanding history of Naveed's granulomatosis with pulmonary nodules, felt to possibly related to underlying vasculitis  However, we have been unable to biopsy the nodules given their peer ground-glass nature  She was last seen in my office in April 2021  At that point, she was not having any significant shortness of breath or wheezing and had actually stopped taking her inhalers  She reports that approximately 3 or 4 months ago, she developed worsening shortness of breaths and noisy breathing  She was seen by ENT in November and laryngoscopy showed tonsillar enlargement, inflamed lymphoid tissue and edema within the glottis at the level of the cords  Subsequent follow-up in December  She was prescribed Prilosec, Pepcid and prednisone taper  Patient admits to not having any symptoms of heartburn and has not been taking any of the reflux medications  Patient has progressive symptoms to the point of being breathless after speaking only 3 words  She has very noisy breathing with upper airway stridor  She complains of persistent headache and occasional visual changes with floaters  No arm or leg weakness  She also has difficulty expectorating sputum    When she is able to bring up the sputum is rust colored  She recently completed a 10 day course of antibiotics  She has no fevers but chronically suffers from chills  Her symptoms have been extremely distressing  She is unable to sleep at night  She finds it difficult to work during the day  Review of Systems  per HPI    Otherwise negative    Medical, Family and Social history reviewed and updated as appropriate    Historical Information   Past Medical History:   Diagnosis Date    Asthma     Disease of thyroid gland     Multiple lung nodules on CT 8/16/2019    Shortness of breath 8/16/2019    Thyroid cyst     Wegener's granulomatosis     Wegener's granulomatosis      Past Surgical History:   Procedure Laterality Date    MOUTH SURGERY      US GUIDED FINE NEEDLE ASPIRATION (ALL INC)  4/10/2014    US GUIDED THYROID BIOPSY  2014     Family History   Problem Relation Age of Onset    Hypertension Mother     No Known Problems Father     No Known Problems Brother     Stroke Maternal Grandmother     Dementia Maternal Grandfather     Diabetes Paternal Grandmother     Dementia Paternal Grandfather     No Known Problems Half-Sister     No Known Problems Half-Sister     No Known Problems Half-Sister     No Known Problems Maternal Aunt     No Known Problems Maternal Aunt     No Known Problems Maternal Aunt     No Known Problems Maternal Aunt     No Known Problems Maternal Aunt     No Known Problems Maternal Aunt     No Known Problems Paternal Aunt     No Known Problems Paternal Aunt     No Known Problems Paternal [de-identified]     Colon cancer Maternal Uncle 58    Breast cancer Cousin 34    BRCA 1/2 Cousin         unknown result       Social History     Tobacco Use   Smoking Status Never Smoker   Smokeless Tobacco Never Used         Meds/Allergies     Current Outpatient Medications:     albuterol (PROVENTIL HFA,VENTOLIN HFA) 90 mcg/act inhaler, Inhale 2 puffs every 4 (four) hours as needed for wheezing, Disp: 1 Inhaler, Rfl: 3    Biotin 10 MG CAPS, Take by mouth, Disp: , Rfl:     famotidine (PEPCID) 40 MG tablet, Take 1 tablet (40 mg total) by mouth daily at bedtime, Disp: 90 tablet, Rfl: 3    fluticasone (Flovent HFA) 110 MCG/ACT inhaler, Inhale 2 puffs 2 (two) times a day Rinse mouth after use , Disp: 12 g, Rfl: 0    folic acid (FOLVITE) 1 mg tablet, Take 1,000 mcg by mouth daily, Disp: , Rfl:     glucosamine-chondroitin 500-400 MG tablet, Take 1 tablet by mouth 3 (three) times a day, Disp: , Rfl:     Multiple Vitamins-Minerals (multivitamin with minerals) tablet, Take 1 tablet by mouth daily, Disp: , Rfl:     norgestimate-ethinyl estradiol (ORTHO TRI-CYCLEN LO) 0 18/0 215/0 25 MG-25 MCG per tablet, Take 1 tablet by mouth daily, Disp: 84 tablet, Rfl: 1    omeprazole (PriLOSEC) 40 MG capsule, Take 1 capsule (40 mg total) by mouth daily Take 30 minutes before breakfast , Disp: 90 capsule, Rfl: 3    predniSONE 10 mg tablet, Take take 40 mg daily for 3 days  Then take 30 mg daily for 3 days  Then as instructed , Disp: 60 tablet, Rfl: 0    Trexall 15 MG tablet, Take 15 mg by mouth weekly before breakfast, Disp: , Rfl:     Trexall 5 MG tablet, , Disp: , Rfl:     predniSONE 10 mg tablet, Take 3 tablets (30 mg total) by mouth daily, Disp: 90 tablet, Rfl: 2    valACYclovir (VALTREX) 500 mg tablet, Take 1 tablet (500 mg total) by mouth daily (Patient taking differently: Take 500 mg by mouth as needed ), Disp: 90 tablet, Rfl: 3  Allergies   Allergen Reactions    Ibuprofen Hives    Other Eye Swelling     seasonal       Vitals: Blood pressure 130/78, pulse (!) 122, temperature 100 2 °F (37 9 °C), temperature source Tympanic, resp  rate 18, height 5' 9 5" (1 765 m), weight 87 7 kg (193 lb 6 4 oz), SpO2 98 %, not currently breastfeeding  Body mass index is 28 15 kg/m²   Oxygen Therapy  SpO2: 98 %  Oxygen Therapy: None (Room air)    Physical Exam   Physical Exam  Constitutional:       General: She is not in acute distress  HENT:      Head: Normocephalic  Mouth/Throat:      Pharynx: No oropharyngeal exudate  Eyes:      General: No scleral icterus  Pupils: Pupils are equal, round, and reactive to light  Neck:      Vascular: No JVD  Comments: Audible stridor  Cardiovascular:      Rate and Rhythm: Normal rate and regular rhythm  Pulmonary:      Breath sounds: No wheezing, rhonchi or rales  Comments: Poor air entry  Abdominal:      Palpations: Abdomen is soft  Tenderness: There is no abdominal tenderness  Musculoskeletal:      Cervical back: Neck supple  Lymphadenopathy:      Cervical: No cervical adenopathy  Skin:     General: Skin is warm and dry  Neurological:      Mental Status: She is alert and oriented to person, place, and time  Psychiatric:         Mood and Affect: Mood normal          Labs: I have personally reviewed pertinent lab results  Lab Results   Component Value Date    WBC 7 6 09/02/2021    HGB 12 6 09/02/2021    HCT 38 3 09/02/2021    MCV 86 8 09/02/2021     (H) 09/02/2021     No results found for: GLUCOSE, CALCIUM, NA, K, CO2, CL, BUN, CREATININE  Lab Results   Component Value Date     (H) 01/20/2020     No results found for: ALT, AST, GGT, ALKPHOS, BILITOT    Imaging and other studies: I have personally reviewed pertinent reports  and I have personally reviewed pertinent films in PACS chest CT from 4/15/21 shows 2 ground-glass nodular opacities in the right lower lobe  They had shown interval increase in size, but without any solid component    Pulmonary function testing:  Performed 2/6/19 and personally interpreted  FEV1/FVC ratio 65%   FEV1 74% predicted  FVC 94% predicted  No response to bronchodilators  TLC 71 % predicted  RV 56 % predicted  DLCO corrected for hemoglobin 76 % predicted  Spirometry was performed in the office today  FEV1/FVC ratio is 35% with an FEV1 of 30% of predicted and FVC of 70% of predicted    Flow volume loop is of poor quality but shows definite flattening of the expiratory limb    Inspiratory limb not well recorded

## 2022-03-29 DIAGNOSIS — J20.8 ACUTE BRONCHITIS DUE TO OTHER SPECIFIED ORGANISMS: Primary | ICD-10-CM

## 2022-03-29 RX ORDER — CEFDINIR 300 MG/1
300 CAPSULE ORAL EVERY 12 HOURS SCHEDULED
Qty: 20 CAPSULE | Refills: 0 | Status: SHIPPED | OUTPATIENT
Start: 2022-03-29 | End: 2022-04-08

## 2022-04-12 ENCOUNTER — HOSPITAL ENCOUNTER (OUTPATIENT)
Dept: RADIOLOGY | Age: 42
Discharge: HOME | End: 2022-04-12
Attending: INTERNAL MEDICINE
Payer: COMMERCIAL

## 2022-04-12 ENCOUNTER — HOSPITAL ENCOUNTER (OUTPATIENT)
Dept: RADIOLOGY | Age: 42
Discharge: HOME | End: 2022-04-12
Payer: COMMERCIAL

## 2022-04-12 DIAGNOSIS — R06.1 STRIDOR: ICD-10-CM

## 2022-04-12 DIAGNOSIS — M31.30 WEGENER'S GRANULOMATOSIS WITHOUT RENAL INVOLVEMENT (HCC): ICD-10-CM

## 2022-04-12 DIAGNOSIS — R91.1 PULMONARY NODULE: ICD-10-CM

## 2022-04-12 PROCEDURE — 70491 CT SOFT TISSUE NECK W/DYE: CPT

## 2022-04-12 PROCEDURE — G1004 CDSM NDSC: HCPCS

## 2022-04-12 PROCEDURE — 71250 CT THORAX DX C-: CPT

## 2022-04-12 RX ADMIN — IOHEXOL 85 ML: 350 INJECTION, SOLUTION INTRAVENOUS at 14:04

## 2022-04-13 ENCOUNTER — HOSPITAL ENCOUNTER (OUTPATIENT)
Dept: RADIOLOGY | Age: 42
Discharge: HOME | End: 2022-04-13
Payer: COMMERCIAL

## 2022-04-13 ENCOUNTER — TELEPHONE (OUTPATIENT)
Dept: PULMONOLOGY | Facility: CLINIC | Age: 42
End: 2022-04-13

## 2022-04-13 DIAGNOSIS — R51.9 HEADACHE, UNSPECIFIED: ICD-10-CM

## 2022-04-13 DIAGNOSIS — M31.30 WEGENER'S GRANULOMATOSIS WITHOUT RENAL INVOLVEMENT (HCC): ICD-10-CM

## 2022-04-13 PROCEDURE — A9585 GADOBUTROL INJECTION: HCPCS | Performed by: INTERNAL MEDICINE

## 2022-04-13 PROCEDURE — 70553 MRI BRAIN STEM W/O & W/DYE: CPT

## 2022-04-13 RX ADMIN — GADOBUTROL 8 ML: 604.72 INJECTION INTRAVENOUS at 12:46

## 2022-04-14 DIAGNOSIS — R05.1 ACUTE COUGH: Primary | ICD-10-CM

## 2022-04-14 NOTE — PROGRESS NOTES
I have spoken at length with the patient and Dr Cassie Bueno  CT of the chest shows interval progression of the previous ground-glass nodules  They are now larger and cavitary in nature  Recent QuantiFERON gold was negative  She completed cefdinir and is now on Bactrim for prior sputum cultures showing Staph aureus  From a symptom standpoint, she is improved in terms of her congestion  she no longer has obvious stridor  Constellation of findings all appear to be progression of Wegener's granulomatosis  We briefly discussed the option of pursuing tissue diagnosis of the pulmonary nodules to confirm our clinical suspicion, however the patient would favor proceeding as promptly as possible with treatment (rituximab per rheum)  I think this is perfectly reasonable  Having said that, if the lesions do not respond as expected, then we would need to reconsider tissue sampling

## 2022-06-13 ENCOUNTER — ANNUAL EXAM (OUTPATIENT)
Dept: OBGYN CLINIC | Facility: CLINIC | Age: 42
End: 2022-06-13
Payer: COMMERCIAL

## 2022-06-13 VITALS
WEIGHT: 203 LBS | BODY MASS INDEX: 29.06 KG/M2 | SYSTOLIC BLOOD PRESSURE: 124 MMHG | HEIGHT: 70 IN | DIASTOLIC BLOOD PRESSURE: 82 MMHG

## 2022-06-13 DIAGNOSIS — Z01.419 ENCOUNTER FOR GYNECOLOGICAL EXAMINATION WITHOUT ABNORMAL FINDING: Primary | ICD-10-CM

## 2022-06-13 DIAGNOSIS — Z12.31 ENCOUNTER FOR SCREENING MAMMOGRAM FOR BREAST CANCER: ICD-10-CM

## 2022-06-13 PROBLEM — R05.1 ACUTE COUGH: Status: RESOLVED | Noted: 2021-11-30 | Resolved: 2022-06-13

## 2022-06-13 PROCEDURE — S0612 ANNUAL GYNECOLOGICAL EXAMINA: HCPCS | Performed by: PHYSICIAN ASSISTANT

## 2022-06-13 NOTE — PROGRESS NOTES
Assessment/Plan:    No problem-specific Assessment & Plan notes found for this encounter  Diagnoses and all orders for this visit:    Encounter for gynecological examination without abnormal finding  -     IGP, Aptima HPV, Rfx 16/18,45    Encounter for screening mammogram for breast cancer  -     Mammo screening bilateral w 3d & cad; Future        Pap done  Order for mammogram entered  Call if periods worsen or change  If no problems, patient to return in 1 year for routine gyn care  Subjective:      Patient ID: Abundio Pickens is a 42 y o  female  Patient is here for yearly gyn exam   States she is doing well overall  Stopped OCP about 3 months ago  Periods are regular once a month, and bleeding lasts for 4-6 days  She denies heavy bleeding, severe cramping, HA, and mood symptoms  Also denies bowel/bladder changes, pelvic pain, bloating, abdominal pain, n/v, change in appetite, and thyroid disease  Followed by rheumatology for autoimmune issues; recently completed infusions  Due for mammogram in October  Patient is performing self-breast exam   Denies new masses, skin changes, nipple discharge, and pain/tenderness  The following portions of the patient's history were reviewed and updated as appropriate: allergies, current medications, past family history, past medical history, past social history, past surgical history and problem list     Review of Systems   Constitutional: Negative for appetite change and unexpected weight change  Cardiovascular:        No masses, skin changes, nipple discharge, and pain/tenderness  Gastrointestinal: Negative for abdominal distention, abdominal pain, constipation, diarrhea, nausea and vomiting  Genitourinary: Negative for difficulty urinating, dysuria, frequency, genital sores, hematuria, menstrual problem, pelvic pain, urgency, vaginal bleeding, vaginal discharge and vaginal pain           Objective:      /82 (BP Location: Left arm, Patient Position: Sitting, Cuff Size: Standard)   Ht 5' 9 5" (1 765 m)   Wt 92 1 kg (203 lb)   LMP 05/22/2022   BMI 29 55 kg/m²          Physical Exam  Vitals reviewed  Exam conducted with a chaperone present  Constitutional:       Appearance: Normal appearance  She is well-developed  Neck:      Thyroid: No thyromegaly  Pulmonary:      Effort: Pulmonary effort is normal    Chest:   Breasts: Breasts are symmetrical       Right: Normal  No swelling, bleeding, inverted nipple, mass, nipple discharge, skin change, tenderness, axillary adenopathy or supraclavicular adenopathy  Left: Normal  No swelling, bleeding, inverted nipple, mass, nipple discharge, skin change, tenderness, axillary adenopathy or supraclavicular adenopathy  Abdominal:      General: Abdomen is flat  There is no distension  Palpations: Abdomen is soft  Tenderness: There is no abdominal tenderness  Genitourinary:     General: Normal vulva  Pubic Area: No rash  Labia:         Right: No rash, tenderness, lesion or injury  Left: No rash, tenderness, lesion or injury  Vagina: Normal  No vaginal discharge, erythema, tenderness or bleeding  Cervix: Normal       Uterus: Normal        Adnexa: Right adnexa normal and left adnexa normal         Right: No mass, tenderness or fullness  Left: No mass, tenderness or fullness  Musculoskeletal:      Cervical back: Neck supple  Lymphadenopathy:      Cervical: No cervical adenopathy  Upper Body:      Right upper body: No supraclavicular or axillary adenopathy  Left upper body: No supraclavicular or axillary adenopathy  Lower Body: No right inguinal adenopathy  No left inguinal adenopathy  Skin:     General: Skin is warm and dry  Neurological:      Mental Status: She is alert and oriented to person, place, and time  Psychiatric:         Mood and Affect: Mood normal          Behavior: Behavior normal  Behavior is cooperative  Thought Content:  Thought content normal          Judgment: Judgment normal

## 2022-06-17 LAB
CYTOLOGIST CVX/VAG CYTO: NORMAL
DX ICD CODE: NORMAL
HPV I/H RISK 4 DNA CVX QL PROBE+SIG AMP: NEGATIVE
OTHER STN SPEC: NORMAL
PATH REPORT.FINAL DX SPEC: NORMAL
SL AMB NOTE:: NORMAL
SL AMB SPECIMEN ADEQUACY: NORMAL
SL AMB TEST METHODOLOGY: NORMAL

## 2022-07-01 LAB
CREAT ?TM UR-SCNC: 267 UMOL/L
EXT PROTEIN URINE: 49
PROT/CREAT UR: 0.05 MG/G{CREAT}

## 2022-08-03 ENCOUNTER — TELEPHONE (OUTPATIENT)
Dept: OBGYN CLINIC | Facility: CLINIC | Age: 42
End: 2022-08-03

## 2022-08-03 NOTE — TELEPHONE ENCOUNTER
Patient called  Has continual occurrence of yeast infection, she would like a refill on diflucan  Usually gets 2 pills

## 2022-08-04 ENCOUNTER — OFFICE VISIT (OUTPATIENT)
Dept: OBGYN CLINIC | Facility: CLINIC | Age: 42
End: 2022-08-04
Payer: COMMERCIAL

## 2022-08-04 VITALS
SYSTOLIC BLOOD PRESSURE: 122 MMHG | DIASTOLIC BLOOD PRESSURE: 66 MMHG | WEIGHT: 208.2 LBS | BODY MASS INDEX: 29.81 KG/M2 | HEIGHT: 70 IN

## 2022-08-04 DIAGNOSIS — N76.0 ACUTE VAGINITIS: Primary | ICD-10-CM

## 2022-08-04 PROCEDURE — 99214 OFFICE O/P EST MOD 30 MIN: CPT | Performed by: PHYSICIAN ASSISTANT

## 2022-08-04 RX ORDER — FLUCONAZOLE 150 MG/1
150 TABLET ORAL ONCE
Qty: 2 TABLET | Refills: 0 | Status: SHIPPED | OUTPATIENT
Start: 2022-08-04 | End: 2022-08-04

## 2022-08-04 NOTE — PROGRESS NOTES
Assessment/Plan:    No problem-specific Assessment & Plan notes found for this encounter  Diagnoses and all orders for this visit:    Acute vaginitis  -     NuSwab Vaginitis (VG)  -     fluconazole (DIFLUCAN) 150 mg tablet; Take 1 tablet (150 mg total) by mouth once for 1 dose        Vaginal cultures done; we will call with results  Rx for Diflucan 150 mg x 2 tablets sent to pharmacy  Call if symptoms worsen or change  Subjective:      Patient ID: Dionicio Finch is a 42 y o  female  Patient is here with complaint of probable vaginal infection  States symptoms started 3 days ago  Experiencing vulvovaginal itching and an increase in discharge  Has a history of recurrent yeast   Denies urinary symptoms, vaginal odor, pelvic pain, abdominal pain, n/v, and fever/chills  No new sexual partners  The following portions of the patient's history were reviewed and updated as appropriate: allergies, current medications, past family history, past medical history, past social history, past surgical history and problem list     Review of Systems   Constitutional: Negative for chills and fever  Gastrointestinal: Negative for abdominal distention, abdominal pain, nausea and vomiting  Genitourinary: Positive for vaginal discharge  Negative for difficulty urinating, dysuria, frequency, genital sores, hematuria, menstrual problem, pelvic pain, urgency, vaginal bleeding and vaginal pain  Vulvovaginal itching         Objective:      /66 (BP Location: Left arm, Patient Position: Sitting, Cuff Size: Adult)   Ht 5' 9 5" (1 765 m)   Wt 94 4 kg (208 lb 3 2 oz)   BMI 30 30 kg/m²          Physical Exam  Vitals reviewed  Exam conducted with a chaperone present  Constitutional:       Appearance: Normal appearance  She is well-developed  Genitourinary:     General: Normal vulva  Pubic Area: No rash  Labia:         Right: No rash, tenderness, lesion or injury           Left: No rash, tenderness, lesion or injury  Vagina: Vaginal discharge (Moderate, white, curd-like) present  No erythema, tenderness or bleeding  Cervix: Normal       Uterus: Normal        Adnexa: Right adnexa normal and left adnexa normal         Right: No mass, tenderness or fullness  Left: No mass, tenderness or fullness  Lymphadenopathy:      Lower Body: No right inguinal adenopathy  No left inguinal adenopathy  Skin:     General: Skin is warm  Neurological:      Mental Status: She is alert and oriented to person, place, and time  Psychiatric:         Mood and Affect: Mood normal          Behavior: Behavior normal  Behavior is cooperative  Thought Content:  Thought content normal          Judgment: Judgment normal

## 2022-09-30 ENCOUNTER — OFFICE VISIT (OUTPATIENT)
Dept: FAMILY MEDICINE CLINIC | Facility: CLINIC | Age: 42
End: 2022-09-30
Payer: COMMERCIAL

## 2022-09-30 VITALS
RESPIRATION RATE: 18 BRPM | WEIGHT: 214 LBS | HEIGHT: 70 IN | OXYGEN SATURATION: 97 % | HEART RATE: 82 BPM | BODY MASS INDEX: 30.64 KG/M2 | TEMPERATURE: 97.3 F | DIASTOLIC BLOOD PRESSURE: 70 MMHG | SYSTOLIC BLOOD PRESSURE: 104 MMHG

## 2022-09-30 DIAGNOSIS — J45.20 MILD INTERMITTENT ASTHMA WITHOUT COMPLICATION: ICD-10-CM

## 2022-09-30 DIAGNOSIS — Z23 NEED FOR VACCINATION: Primary | ICD-10-CM

## 2022-09-30 DIAGNOSIS — M31.30 WEGENER'S GRANULOMATOSIS WITHOUT RENAL INVOLVEMENT (HCC): ICD-10-CM

## 2022-09-30 DIAGNOSIS — R91.1 PULMONARY NODULE: ICD-10-CM

## 2022-09-30 DIAGNOSIS — Z00.00 ANNUAL PHYSICAL EXAM: ICD-10-CM

## 2022-09-30 PROBLEM — R06.02 SHORTNESS OF BREATH: Status: RESOLVED | Noted: 2019-08-16 | Resolved: 2022-09-30

## 2022-09-30 LAB
CHOLEST SERPL-MCNC: 184 MG/DL
CHOLEST/HDLC SERPL: 4.8 (CALC)
HDLC SERPL-MCNC: 38 MG/DL
LDLC SERPL CALC-MCNC: 123 MG/DL (CALC)
NONHDLC SERPL-MCNC: 146 MG/DL (CALC)
TRIGL SERPL-MCNC: 121 MG/DL

## 2022-09-30 PROCEDURE — 3725F SCREEN DEPRESSION PERFORMED: CPT | Performed by: FAMILY MEDICINE

## 2022-09-30 PROCEDURE — 90471 IMMUNIZATION ADMIN: CPT | Performed by: FAMILY MEDICINE

## 2022-09-30 PROCEDURE — 90472 IMMUNIZATION ADMIN EACH ADD: CPT | Performed by: FAMILY MEDICINE

## 2022-09-30 PROCEDURE — 99396 PREV VISIT EST AGE 40-64: CPT | Performed by: FAMILY MEDICINE

## 2022-09-30 PROCEDURE — 90682 RIV4 VACC RECOMBINANT DNA IM: CPT | Performed by: FAMILY MEDICINE

## 2022-09-30 PROCEDURE — 90677 PCV20 VACCINE IM: CPT | Performed by: FAMILY MEDICINE

## 2022-09-30 NOTE — PROGRESS NOTES
Assessment/Plan:         Problem List Items Addressed This Visit        Respiratory    Mild intermittent asthma without complication     Currently no flare up            Cardiovascular and Mediastinum    Wegener's granulomatosis without renal involvement [facility])     Reviewed rheumatology notes on rituximab            Other    Pulmonary nodule     Reviewed CT seeing pulmonologist         Annual physical exam     all labs done except lipid panel         Relevant Orders    Lipid panel      Other Visit Diagnoses     Need for vaccination    -  Primary    Relevant Orders    FLUBLOK: influenza vaccine, quadrivalent, recombinant, PF, 0 5 mL    Pneumococcal Conjugate Vaccine 20-valent (Pcv20)            Subjective:  Pt here for annual physical sees rheumatologist oncologist and pulmonologist for wegeners     Patient ID: Costa Parker is a 42 y o  female  HPI    The following portions of the patient's history were reviewed and updated as appropriate:   Past Medical History:  She has a past medical history of Asthma, Disease of thyroid gland, Multiple lung nodules on CT (8/16/2019), Shortness of breath (8/16/2019), Thyroid cyst, Wegener's granulomatosis, and Wegener's granulomatosis  ,  _______________________________________________________________________  Medical Problems:  does not have any pertinent problems on file ,  _______________________________________________________________________  Past Surgical History:   has a past surgical history that includes US guided thyroid biopsy (2014); Mouth surgery; and US guided fine needle aspiration (all inc) (4/10/2014)  ,  _______________________________________________________________________  Family History:  family history includes BRCA 1/2 in her cousin; Breast cancer (age of onset: 34) in her cousin; Colon cancer (age of onset: 58) in her maternal uncle; Dementia in her maternal grandfather and paternal grandfather; Diabetes in her paternal grandmother; Hypertension in her mother; No Known Problems in her brother, father, half-sister, half-sister, half-sister, maternal aunt, maternal aunt, maternal aunt, maternal aunt, maternal aunt, maternal aunt, paternal aunt, paternal aunt, and paternal aunt; Stroke in her maternal grandmother ,  _______________________________________________________________________  Social History:   reports that she has never smoked  She has never used smokeless tobacco  She reports previous alcohol use  She reports that she does not use drugs  ,  _______________________________________________________________________  Allergies:  is allergic to ibuprofen and other     _______________________________________________________________________  Current Outpatient Medications   Medication Sig Dispense Refill    albuterol (PROVENTIL HFA,VENTOLIN HFA) 90 mcg/act inhaler Inhale 2 puffs every 4 (four) hours as needed for wheezing 1 Inhaler 3    Biotin 10 MG CAPS Take by mouth      fluticasone (Flovent HFA) 110 MCG/ACT inhaler Inhale 2 puffs 2 (two) times a day Rinse mouth after use   12 g 0    Fluticasone Propionate 93 MCG/ACT EXHU 1 actuation into each nostril 2 (two) times a day 16 mL 6    glucosamine-chondroitin 500-400 MG tablet Take 1 tablet by mouth 3 (three) times a day      Multiple Vitamins-Minerals (multivitamin with minerals) tablet Take 1 tablet by mouth daily      omeprazole (PriLOSEC) 40 MG capsule Take 1 capsule (40 mg total) by mouth daily Take 30 minutes before breakfast  90 capsule 3    valACYclovir (VALTREX) 500 mg tablet Take 1 tablet (500 mg total) by mouth daily (Patient taking differently: Take 500 mg by mouth as needed) 90 tablet 3    folic acid (FOLVITE) 1 mg tablet Take 1,000 mcg by mouth daily (Patient not taking: Reported on 9/30/2022)       No current facility-administered medications for this visit      _______________________________________________________________________  Review of Systems   Constitutional: Negative for appetite change, chills, fatigue and fever  HENT: Positive for congestion (always has sinus pressure and congestion due to wegeners)  Respiratory: Negative for cough, chest tightness and shortness of breath  Cardiovascular: Negative for chest pain, palpitations and leg swelling  Gastrointestinal: Negative for abdominal pain, constipation, diarrhea, nausea and vomiting  Genitourinary: Negative for difficulty urinating and frequency  Musculoskeletal: Negative for arthralgias, back pain and neck pain  Skin: Negative for rash  Neurological: Negative for dizziness, weakness, light-headedness, numbness and headaches  Hematological: Does not bruise/bleed easily  Psychiatric/Behavioral: Negative for dysphoric mood and sleep disturbance  The patient is not nervous/anxious  Objective:  Vitals:    09/30/22 0851   BP: 104/70   BP Location: Left arm   Patient Position: Sitting   Cuff Size: Large   Pulse: 82   Resp: 18   Temp: (!) 97 3 °F (36 3 °C)   TempSrc: Temporal   SpO2: 97%   Weight: 97 1 kg (214 lb)   Height: 5' 9 5" (1 765 m)     Body mass index is 31 15 kg/m²       Physical Exam

## 2022-10-02 NOTE — RESULT ENCOUNTER NOTE
Cholesterol is high diet guidelines and increase exercise and repeat in 6 mo Problem: Falls - Risk of  Goal: *Absence of Falls  Document Paul Fall Risk and appropriate interventions in the flowsheet. Outcome: Progressing Towards Goal  Fall Risk Interventions:       Mentation Interventions: Door open when patient unattended    Medication Interventions: Evaluate medications/consider consulting pharmacy    Elimination Interventions: Bed/chair exit alarm, Call light in reach             Problem: Pressure Injury - Risk of  Goal: *Prevention of pressure injury  Document Virgilio Scale and appropriate interventions in the flowsheet.   Outcome: Progressing Towards Goal  Pressure Injury Interventions:  Sensory Interventions: Assess changes in LOC, Avoid rigorous massage over bony prominences    Moisture Interventions: Absorbent underpads    Activity Interventions: Assess need for specialty bed    Mobility Interventions: Assess need for specialty bed    Nutrition Interventions: Offer support with meals,snacks and hydration    Friction and Shear Interventions: Apply protective barrier, creams and emollients

## 2022-10-05 DIAGNOSIS — R79.9 ABNORMAL BLOOD CHEMISTRY: Primary | ICD-10-CM

## 2022-10-20 ENCOUNTER — CLINICAL SUPPORT (OUTPATIENT)
Dept: FAMILY MEDICINE CLINIC | Facility: CLINIC | Age: 42
End: 2022-10-20
Payer: COMMERCIAL

## 2022-10-20 DIAGNOSIS — Z23 COVID-19 VACCINE ADMINISTERED: Primary | ICD-10-CM

## 2022-10-20 PROCEDURE — 91312 SARSCOV2 VAC BVL 30MCG/0.3ML: CPT

## 2022-10-20 PROCEDURE — 0124A ADM SARSCV2 BVL 30MCG/.3ML B: CPT

## 2022-10-29 ENCOUNTER — HOSPITAL ENCOUNTER (OUTPATIENT)
Dept: MAMMOGRAPHY | Facility: HOSPITAL | Age: 42
Discharge: HOME | End: 2022-10-29

## 2022-10-29 DIAGNOSIS — Z12.31 ENCOUNTER FOR SCREENING MAMMOGRAM FOR BREAST CANCER: ICD-10-CM

## 2022-10-29 DIAGNOSIS — Z12.31 ENCOUNTER FOR SCREENING MAMMOGRAM FOR MALIGNANT NEOPLASM OF BREAST: ICD-10-CM

## 2022-12-07 ENCOUNTER — HOSPITAL ENCOUNTER (OUTPATIENT)
Dept: RADIOLOGY | Age: 42
Discharge: HOME | End: 2022-12-07

## 2022-12-07 DIAGNOSIS — M31.30 GRANULOMATOSIS WITH POLYANGIITIS, UNSPECIFIED WHETHER RENAL INVOLVEMENT (HCC): ICD-10-CM

## 2022-12-12 ENCOUNTER — TELEPHONE (OUTPATIENT)
Dept: FAMILY MEDICINE CLINIC | Facility: CLINIC | Age: 42
End: 2022-12-12

## 2023-03-17 DIAGNOSIS — R79.9 ABNORMAL BLOOD CHEMISTRY: Primary | ICD-10-CM

## 2023-03-17 DIAGNOSIS — D64.9 MILD ANEMIA: ICD-10-CM

## 2023-03-30 LAB
FERRITIN SERPL-MCNC: 5 NG/ML (ref 16–232)
IRON SATN MFR SERPL: 10 % (CALC) (ref 16–45)
IRON SERPL-MCNC: 46 MCG/DL (ref 40–190)
TIBC SERPL-MCNC: 457 MCG/DL (CALC) (ref 250–450)

## 2023-03-31 ENCOUNTER — TELEPHONE (OUTPATIENT)
Dept: FAMILY MEDICINE CLINIC | Facility: CLINIC | Age: 43
End: 2023-03-31

## 2023-03-31 DIAGNOSIS — D64.9 ANEMIA, UNSPECIFIED TYPE: Primary | ICD-10-CM

## 2023-04-05 DIAGNOSIS — B00.9 HERPES: ICD-10-CM

## 2023-04-05 RX ORDER — VALACYCLOVIR HYDROCHLORIDE 500 MG/1
500 TABLET, FILM COATED ORAL DAILY
Qty: 90 TABLET | Refills: 3 | Status: SHIPPED | OUTPATIENT
Start: 2023-04-05 | End: 2023-07-04

## 2023-05-23 ENCOUNTER — APPOINTMENT (OUTPATIENT)
Dept: LAB | Facility: CLINIC | Age: 43
End: 2023-05-23

## 2023-05-23 DIAGNOSIS — M31.30 GRANULOMATOSIS WITH POLYANGIITIS, UNSPECIFIED WHETHER RENAL INVOLVEMENT (HCC): ICD-10-CM

## 2023-05-23 DIAGNOSIS — D64.9 ANEMIA, UNSPECIFIED TYPE: ICD-10-CM

## 2023-05-23 DIAGNOSIS — Z79.899 ENCOUNTER FOR LONG-TERM (CURRENT) USE OF OTHER MEDICATIONS: ICD-10-CM

## 2023-05-23 LAB
ALBUMIN SERPL BCP-MCNC: 4.3 G/DL (ref 3.5–5)
ALP SERPL-CCNC: 73 U/L (ref 34–104)
ALT SERPL W P-5'-P-CCNC: 11 U/L (ref 7–52)
ANION GAP SERPL CALCULATED.3IONS-SCNC: 4 MMOL/L (ref 4–13)
AST SERPL W P-5'-P-CCNC: 15 U/L (ref 13–39)
BASOPHILS # BLD AUTO: 0.06 THOUSANDS/ÂΜL (ref 0–0.1)
BASOPHILS NFR BLD AUTO: 1 % (ref 0–1)
BILIRUB SERPL-MCNC: 0.26 MG/DL (ref 0.2–1)
BUN SERPL-MCNC: 7 MG/DL (ref 5–25)
CALCIUM SERPL-MCNC: 9.4 MG/DL (ref 8.4–10.2)
CHLORIDE SERPL-SCNC: 103 MMOL/L (ref 96–108)
CO2 SERPL-SCNC: 30 MMOL/L (ref 21–32)
CREAT SERPL-MCNC: 0.8 MG/DL (ref 0.6–1.3)
CRP SERPL QL: 9.1 MG/L
EOSINOPHIL # BLD AUTO: 0.21 THOUSAND/ÂΜL (ref 0–0.61)
EOSINOPHIL NFR BLD AUTO: 3 % (ref 0–6)
ERYTHROCYTE [DISTWIDTH] IN BLOOD BY AUTOMATED COUNT: 15.7 % (ref 11.6–15.1)
ERYTHROCYTE [SEDIMENTATION RATE] IN BLOOD: 22 MM/HOUR (ref 0–19)
FERRITIN SERPL-MCNC: 11 NG/ML (ref 11–307)
GFR SERPL CREATININE-BSD FRML MDRD: 90 ML/MIN/1.73SQ M
GLUCOSE SERPL-MCNC: 92 MG/DL (ref 65–140)
HCT VFR BLD AUTO: 38.5 % (ref 34.8–46.1)
HGB BLD-MCNC: 11.9 G/DL (ref 11.5–15.4)
IMM GRANULOCYTES # BLD AUTO: 0.02 THOUSAND/UL (ref 0–0.2)
IMM GRANULOCYTES NFR BLD AUTO: 0 % (ref 0–2)
IRON SATN MFR SERPL: 11 % (ref 15–50)
IRON SERPL-MCNC: 39 UG/DL (ref 50–170)
LYMPHOCYTES # BLD AUTO: 2.81 THOUSANDS/ÂΜL (ref 0.6–4.47)
LYMPHOCYTES NFR BLD AUTO: 35 % (ref 14–44)
MCH RBC QN AUTO: 27.6 PG (ref 26.8–34.3)
MCHC RBC AUTO-ENTMCNC: 30.9 G/DL (ref 31.4–37.4)
MCV RBC AUTO: 89 FL (ref 82–98)
MONOCYTES # BLD AUTO: 0.51 THOUSAND/ÂΜL (ref 0.17–1.22)
MONOCYTES NFR BLD AUTO: 6 % (ref 4–12)
NEUTROPHILS # BLD AUTO: 4.54 THOUSANDS/ÂΜL (ref 1.85–7.62)
NEUTS SEG NFR BLD AUTO: 55 % (ref 43–75)
NRBC BLD AUTO-RTO: 0 /100 WBCS
PLATELET # BLD AUTO: 438 THOUSANDS/UL (ref 149–390)
PMV BLD AUTO: 9.5 FL (ref 8.9–12.7)
POTASSIUM SERPL-SCNC: 3.8 MMOL/L (ref 3.5–5.3)
PROT SERPL-MCNC: 7.9 G/DL (ref 6.4–8.4)
RBC # BLD AUTO: 4.31 MILLION/UL (ref 3.81–5.12)
SODIUM SERPL-SCNC: 137 MMOL/L (ref 135–147)
TIBC SERPL-MCNC: 368 UG/DL (ref 250–450)
WBC # BLD AUTO: 8.15 THOUSAND/UL (ref 4.31–10.16)

## 2023-05-24 ENCOUNTER — CONSULT (OUTPATIENT)
Dept: GASTROENTEROLOGY | Facility: AMBULARY SURGERY CENTER | Age: 43
End: 2023-05-24

## 2023-05-24 VITALS
RESPIRATION RATE: 18 BRPM | WEIGHT: 209.4 LBS | HEART RATE: 78 BPM | DIASTOLIC BLOOD PRESSURE: 60 MMHG | SYSTOLIC BLOOD PRESSURE: 122 MMHG | BODY MASS INDEX: 31.01 KG/M2 | OXYGEN SATURATION: 100 % | HEIGHT: 69 IN

## 2023-05-24 DIAGNOSIS — D50.9 IRON DEFICIENCY ANEMIA, UNSPECIFIED IRON DEFICIENCY ANEMIA TYPE: Primary | ICD-10-CM

## 2023-05-24 NOTE — PATIENT INSTRUCTIONS
Scheduled date of EGD/colonoscopy (as of today): 07/17/23  Physician performing EGD/colonoscopy: dr Chelly Angel  Location of EGD/colonoscopy: [facility] [[email or web address]  Desired bowel prep reviewed with patient: clenpiq - voucer  Instructions reviewed with patient by: lima  Clearances:   n a

## 2023-05-24 NOTE — PROGRESS NOTES
[facility] - Outpatient Consultation  Rojelio Gong 36 y o  female MRN: [id number]  Encounter: [id number]      PCP: Guillermo Fajardo MD  Referring: Guillermo Fajardo MD  [address]  [address] [place] [address]      ASSESSMENT AND PLAN:      1  Iron deficiency anemia, unspecified iron deficiency anemia type  Hemoglobin 11 9, ferritin 5 on recent labs consistent with iron deficiency  Denies overt GI bleeding,  ? Contribution of frequent epistaxis  We discussed bidirectional endoscopic evaluation to rule out sources of GI blood loss  Would recommend continued follow-up with hematology, ?rituximab contribution vs consider iron infusions if not responding appropriately to oral iron  - Colonoscopy; Future  - EGD; Future  - sodium picosulfate, magnesium oxide, citric acid (Clenpiq) oral solution; Take 175 mL (1 bottle) the evening before the colonoscopy, between 5 PM and 9 PM, followed by a second 175 mL bottle 5 hours before the colonoscopy  Dispense: 350 mL; Refill: 0  - consider capsule endoscopy if above evaluation is unrevealing    ______________________________________________________________________    CC:  Chief Complaint   Patient presents with   • Screening Colonoscopy     first   • Anemia       HPI:      Patient is a 44-year-old female referred for iron deficiency anemia  She has thyroid disease, asthma, Wegener's granulomatosis, headaches, nasal dryness, BMI 31  CBC with hemoglobin 11 9, ferritin 5  She was started on oral iron for the last 1 month without significant improvement  She does admit to frequent, heavy nosebleeds- none for the last 3 weeks  She denies any overt GI bleeding  She is receiving rituximab infusions with Dr Cherelle Fulton for her Wegener's  She has LPR, on Prilosec and follows with ENT  Her symptoms of reflux are well controlled on Prilosec        REVIEW OF SYSTEMS:    CONSTITUTIONAL: Denies any fever, chills, rigors, and weight loss   HEENT: No earache or tinnitus  Denies hearing loss or visual disturbances  CARDIOVASCULAR: No chest pain or palpitations  RESPIRATORY: Denies any cough, hemoptysis, shortness of breath or dyspnea on exertion  GASTROINTESTINAL: As noted in the History of Present Illness  GENITOURINARY: No problems with urination  Denies any hematuria or dysuria  NEUROLOGIC: No dizziness or vertigo, denies headaches  MUSCULOSKELETAL: Denies any muscle or joint pain  SKIN: Denies skin rashes or itching  ENDOCRINE: Denies excessive thirst  Denies intolerance to heat or cold  PSYCHOSOCIAL: Denies depression or anxiety  Denies any recent memory loss         Historical Information   Past Medical History:   Diagnosis Date   • Asthma    • Disease of thyroid gland    • Multiple lung nodules on CT 8/16/2019   • Shortness of breath 8/16/2019   • Thyroid cyst    • Wegener's granulomatosis    • Wegener's granulomatosis      Past Surgical History:   Procedure Laterality Date   • MOUTH SURGERY     • US GUIDED FINE NEEDLE ASPIRATION (ALL INC)  4/10/2014   • US GUIDED THYROID BIOPSY  2014     Social History   Social History     Substance and Sexual Activity   Alcohol Use Not Currently    Comment: social     Social History     Substance and Sexual Activity   Drug Use Never     Social History     Tobacco Use   Smoking Status Never   Smokeless Tobacco Never     Family History   Problem Relation Age of Onset   • Hypertension Mother    • No Known Problems Father    • No Known Problems Brother    • Stroke Maternal Grandmother    • Dementia Maternal Grandfather    • Diabetes Paternal Grandmother    • Dementia Paternal Grandfather    • No Known Problems Half-Sister    • No Known Problems Half-Sister    • No Known Problems Half-Sister    • No Known Problems Maternal Aunt    • No Known Problems Maternal Aunt    • No Known Problems Maternal Aunt    • No Known Problems Maternal Aunt    • No Known Problems Maternal Aunt    • No Known Problems "Maternal Aunt    • No Known Problems Paternal Aunt    • No Known Problems Paternal Aunt    • No Known Problems Paternal Aunt    • Colon cancer Maternal Uncle 58   • Breast cancer Cousin 34   • BRCA 1/2 Cousin         unknown result       Meds/Allergies       Current Outpatient Medications:   •  albuterol (PROVENTIL HFA,VENTOLIN HFA) 90 mcg/act inhaler  •  Biotin 10 MG CAPS  •  fluticasone (Flovent HFA) 110 MCG/ACT inhaler  •  Fluticasone Propionate 93 MCG/ACT EXHU  •  Multiple Vitamins-Minerals (multivitamin with minerals) tablet  •  omeprazole (PriLOSEC) 40 MG capsule  •  valACYclovir (VALTREX) 500 mg tablet  •  folic acid (FOLVITE) 1 mg tablet  •  glucosamine-chondroitin 500-400 MG tablet    Allergies   Allergen Reactions   • Ibuprofen Hives   • Other Eye Swelling     seasonal           Objective     Blood pressure 122/60, pulse 78, resp  rate 18, height 5' 9\" (1 753 m), weight 95 kg (209 lb 6 4 oz), SpO2 100 %, not currently breastfeeding  Body mass index is 30 92 kg/m²  PHYSICAL EXAM:      General Appearance:   Alert, cooperative, no distress   HEENT:   Normocephalic, atraumatic, anicteric  Neck:  Supple, symmetrical, trachea midline   Lungs:   Clear to auscultation bilaterally; no rales, rhonchi or wheezing; respirations unlabored    Heart[de-identified]   Regular rate and rhythm; no murmur, rub, or gallop     Abdomen:   Soft, non-tender, non-distended; normal bowel sounds; no masses, no organomegaly    Genitalia:   Deferred    Rectal:   Deferred    Extremities:  No cyanosis, clubbing or edema    Pulses:  2+ and symmetric    Skin:  No jaundice, rashes, or lesions    Lymph nodes:  No palpable cervical lymphadenopathy        Lab Results:     Lab Results   Component Value Date    HCT 38 5 05/23/2023    HGB 11 9 05/23/2023    MCV 89 05/23/2023     (H) 05/23/2023    WBC 8 15 05/23/2023       Lab Results   Component Value Date    ALKPHOS 73 05/23/2023    ALT 11 05/23/2023    AST 15 05/23/2023    BUN 7 05/23/2023    " "CALCIUM 9 4 05/23/2023     05/23/2023    CO2 30 05/23/2023    CREATININE 0 80 05/23/2023    EGFR 90 05/23/2023    K 3 8 05/23/2023       No results found for: \"INR\", \"PROTIME\"      Radiology Results:   No results found  Portions of the record may have been created with voice recognition software  Occasional wrong word or \"sound a like\" substitutions may have occurred due to the inherent limitations of voice recognition software  Read the chart carefully and recognize, using context, where substitutions have occurred          "

## 2023-05-26 LAB
C-ANCA TITR SER IF: NORMAL TITER
MYELOPEROXIDASE AB SER IA-ACNC: <0.2 UNITS (ref 0–0.9)
P-ANCA ATYPICAL TITR SER IF: NORMAL TITER
P-ANCA TITR SER IF: NORMAL TITER
PROTEINASE3 AB SER IA-ACNC: 0.8 UNITS (ref 0–0.9)

## 2023-06-20 PROCEDURE — 87205 SMEAR GRAM STAIN: CPT | Performed by: OTOLARYNGOLOGY

## 2023-06-20 PROCEDURE — 87070 CULTURE OTHR SPECIMN AEROBIC: CPT | Performed by: OTOLARYNGOLOGY

## 2023-06-22 ENCOUNTER — ANNUAL EXAM (OUTPATIENT)
Dept: OBGYN CLINIC | Facility: CLINIC | Age: 43
End: 2023-06-22
Payer: COMMERCIAL

## 2023-06-22 VITALS
WEIGHT: 210 LBS | HEIGHT: 69 IN | DIASTOLIC BLOOD PRESSURE: 82 MMHG | BODY MASS INDEX: 31.1 KG/M2 | SYSTOLIC BLOOD PRESSURE: 130 MMHG

## 2023-06-22 DIAGNOSIS — Z01.419 ENCOUNTER FOR GYNECOLOGICAL EXAMINATION WITHOUT ABNORMAL FINDING: Primary | ICD-10-CM

## 2023-06-22 DIAGNOSIS — Z12.31 ENCOUNTER FOR SCREENING MAMMOGRAM FOR BREAST CANCER: ICD-10-CM

## 2023-06-22 PROBLEM — Z00.00 ANNUAL PHYSICAL EXAM: Status: RESOLVED | Noted: 2021-08-30 | Resolved: 2023-06-22

## 2023-06-22 PROCEDURE — G0476 HPV COMBO ASSAY CA SCREEN: HCPCS | Performed by: PHYSICIAN ASSISTANT

## 2023-06-22 PROCEDURE — S0612 ANNUAL GYNECOLOGICAL EXAMINA: HCPCS | Performed by: PHYSICIAN ASSISTANT

## 2023-06-22 PROCEDURE — G0145 SCR C/V CYTO,THINLAYER,RESCR: HCPCS | Performed by: PHYSICIAN ASSISTANT

## 2023-06-22 NOTE — PROGRESS NOTES
Assessment/Plan:    No problem-specific Assessment & Plan notes found for this encounter  Diagnoses and all orders for this visit:    Encounter for gynecological examination without abnormal finding  -     Cancel: Liquid-based pap, screening  -     Liquid-based pap, screening    Encounter for screening mammogram for breast cancer  -     Mammo screening bilateral w 3d & cad; Future        Pap done  Order for mammogram entered  Call if periods worsen or change  If no problems, patient to return in 1 year for routine gyn care  Subjective:      Patient ID: Steve Baumann is a 36 y o  female  Patient is here for yearly gyn exam   States she is doing well overall  Periods are regular every 24-26 days, and bleeding lasts for 5 days  She denies heavy bleeding, severe cramping, HA, and mood symptoms  Does get some back pain  Denies bowel/bladder changes, pelvic pain, bloating, abdominal pain, n/v, change in appetite, and thyroid disease  Wegener's is under good control; followed by rheumatology  Due for mammogram in October  Patient is performing self-breast exam   Denies new masses, skin changes, nipple discharge, and pain/tenderness  The following portions of the patient's history were reviewed and updated as appropriate: allergies, current medications, past family history, past medical history, past social history, past surgical history and problem list     Review of Systems   Constitutional: Negative for appetite change and unexpected weight change  Cardiovascular:        No masses, skin changes, nipple discharge, and pain/tenderness  Gastrointestinal: Negative for abdominal distention, abdominal pain, constipation, diarrhea, nausea and vomiting  Genitourinary: Negative for difficulty urinating, dysuria, frequency, genital sores, hematuria, menstrual problem, pelvic pain, urgency, vaginal bleeding, vaginal discharge and vaginal pain           Objective:      /82 (BP Location: Left "arm, Patient Position: Sitting, Cuff Size: Adult)   Ht 5' 9\" (1 753 m)   Wt 95 3 kg (210 lb)   LMP 06/05/2023 (Approximate)   BMI 31 01 kg/m²          Physical Exam  Vitals reviewed  Exam conducted with a chaperone present  Constitutional:       Appearance: Normal appearance  She is well-developed  Neck:      Thyroid: No thyromegaly  Pulmonary:      Effort: Pulmonary effort is normal    Chest:   Breasts:     Breasts are symmetrical       Right: Normal  No swelling, bleeding, inverted nipple, mass, nipple discharge, skin change or tenderness  Left: Normal  No swelling, bleeding, inverted nipple, mass, nipple discharge, skin change or tenderness  Abdominal:      General: Abdomen is flat  There is no distension  Palpations: Abdomen is soft  Tenderness: There is no abdominal tenderness  Genitourinary:     General: Normal vulva  Pubic Area: No rash  Labia:         Right: No rash, tenderness, lesion or injury  Left: No rash, tenderness, lesion or injury  Vagina: Normal  No vaginal discharge, erythema, tenderness or bleeding  Cervix: Normal       Uterus: Normal        Adnexa: Right adnexa normal and left adnexa normal         Right: No mass, tenderness or fullness  Left: No mass, tenderness or fullness  Musculoskeletal:      Cervical back: Neck supple  Lymphadenopathy:      Cervical: No cervical adenopathy  Upper Body:      Right upper body: No supraclavicular or axillary adenopathy  Left upper body: No supraclavicular or axillary adenopathy  Lower Body: No right inguinal adenopathy  No left inguinal adenopathy  Skin:     General: Skin is warm and dry  Neurological:      Mental Status: She is alert and oriented to person, place, and time  Psychiatric:         Mood and Affect: Mood normal          Behavior: Behavior normal  Behavior is cooperative  Thought Content:  Thought content normal          Judgment: Judgment normal  "

## 2023-06-29 LAB
LAB AP GYN PRIMARY INTERPRETATION: NORMAL
Lab: NORMAL

## 2023-07-12 ENCOUNTER — APPOINTMENT (OUTPATIENT)
Dept: LAB | Facility: CLINIC | Age: 43
End: 2023-07-12
Payer: COMMERCIAL

## 2023-07-12 DIAGNOSIS — Z79.899 ENCOUNTER FOR LONG-TERM (CURRENT) USE OF OTHER MEDICATIONS: ICD-10-CM

## 2023-07-12 DIAGNOSIS — M31.30 GRANULOMATOSIS WITH POLYANGIITIS, UNSPECIFIED WHETHER RENAL INVOLVEMENT (HCC): ICD-10-CM

## 2023-07-12 DIAGNOSIS — Z01.818 PRE-OP TESTING: ICD-10-CM

## 2023-07-12 DIAGNOSIS — H60.62 CHRONIC OTITIS EXTERNA OF LEFT EAR, UNSPECIFIED TYPE: Primary | ICD-10-CM

## 2023-07-12 LAB
ALBUMIN SERPL BCP-MCNC: 4.3 G/DL (ref 3.5–5)
ALP SERPL-CCNC: 70 U/L (ref 34–104)
ALT SERPL W P-5'-P-CCNC: 12 U/L (ref 7–52)
ANION GAP SERPL CALCULATED.3IONS-SCNC: 6 MMOL/L
AST SERPL W P-5'-P-CCNC: 17 U/L (ref 13–39)
BACTERIA UR QL AUTO: ABNORMAL /HPF
BASOPHILS # BLD AUTO: 0.04 THOUSANDS/ÂΜL (ref 0–0.1)
BASOPHILS NFR BLD AUTO: 1 % (ref 0–1)
BILIRUB SERPL-MCNC: 0.37 MG/DL (ref 0.2–1)
BILIRUB UR QL STRIP: NEGATIVE
BUN SERPL-MCNC: 7 MG/DL (ref 5–25)
CALCIUM SERPL-MCNC: 9.9 MG/DL (ref 8.4–10.2)
CHLORIDE SERPL-SCNC: 101 MMOL/L (ref 96–108)
CLARITY UR: CLEAR
CO2 SERPL-SCNC: 30 MMOL/L (ref 21–32)
COLOR UR: YELLOW
CREAT SERPL-MCNC: 0.92 MG/DL (ref 0.6–1.3)
CREAT UR-MCNC: 394.9 MG/DL
CRP SERPL QL: 9.1 MG/L
EOSINOPHIL # BLD AUTO: 0.19 THOUSAND/ÂΜL (ref 0–0.61)
EOSINOPHIL NFR BLD AUTO: 2 % (ref 0–6)
ERYTHROCYTE [DISTWIDTH] IN BLOOD BY AUTOMATED COUNT: 14.1 % (ref 11.6–15.1)
ERYTHROCYTE [SEDIMENTATION RATE] IN BLOOD: 18 MM/HOUR (ref 0–19)
GFR SERPL CREATININE-BSD FRML MDRD: 76 ML/MIN/1.73SQ M
GLUCOSE SERPL-MCNC: 82 MG/DL (ref 65–140)
GLUCOSE UR STRIP-MCNC: NEGATIVE MG/DL
HCT VFR BLD AUTO: 35.8 % (ref 34.8–46.1)
HGB BLD-MCNC: 11.4 G/DL (ref 11.5–15.4)
HGB UR QL STRIP.AUTO: NEGATIVE
IMM GRANULOCYTES # BLD AUTO: 0.02 THOUSAND/UL (ref 0–0.2)
IMM GRANULOCYTES NFR BLD AUTO: 0 % (ref 0–2)
KETONES UR STRIP-MCNC: NEGATIVE MG/DL
LEUKOCYTE ESTERASE UR QL STRIP: NEGATIVE
LYMPHOCYTES # BLD AUTO: 2.54 THOUSANDS/ÂΜL (ref 0.6–4.47)
LYMPHOCYTES NFR BLD AUTO: 31 % (ref 14–44)
MCH RBC QN AUTO: 27.7 PG (ref 26.8–34.3)
MCHC RBC AUTO-ENTMCNC: 31.8 G/DL (ref 31.4–37.4)
MCV RBC AUTO: 87 FL (ref 82–98)
MONOCYTES # BLD AUTO: 0.6 THOUSAND/ÂΜL (ref 0.17–1.22)
MONOCYTES NFR BLD AUTO: 7 % (ref 4–12)
MUCOUS THREADS UR QL AUTO: ABNORMAL
NEUTROPHILS # BLD AUTO: 4.77 THOUSANDS/ÂΜL (ref 1.85–7.62)
NEUTS SEG NFR BLD AUTO: 59 % (ref 43–75)
NITRITE UR QL STRIP: NEGATIVE
NON-SQ EPI CELLS URNS QL MICRO: ABNORMAL /HPF
NRBC BLD AUTO-RTO: 0 /100 WBCS
PH UR STRIP.AUTO: 6.5 [PH]
PLATELET # BLD AUTO: 416 THOUSANDS/UL (ref 149–390)
PMV BLD AUTO: 9.7 FL (ref 8.9–12.7)
POTASSIUM SERPL-SCNC: 3.8 MMOL/L (ref 3.5–5.3)
PROT SERPL-MCNC: 7.9 G/DL (ref 6.4–8.4)
PROT UR STRIP-MCNC: ABNORMAL MG/DL
PROT UR-MCNC: 17 MG/DL
PROT/CREAT UR: 0.04 MG/G{CREAT} (ref 0–0.1)
RBC # BLD AUTO: 4.11 MILLION/UL (ref 3.81–5.12)
RBC #/AREA URNS AUTO: ABNORMAL /HPF
SODIUM SERPL-SCNC: 137 MMOL/L (ref 135–147)
SP GR UR STRIP.AUTO: 1.03 (ref 1–1.03)
UROBILINOGEN UR STRIP-ACNC: <2 MG/DL
WBC # BLD AUTO: 8.16 THOUSAND/UL (ref 4.31–10.16)
WBC #/AREA URNS AUTO: ABNORMAL /HPF

## 2023-07-12 PROCEDURE — 36415 COLL VENOUS BLD VENIPUNCTURE: CPT

## 2023-07-12 PROCEDURE — 85652 RBC SED RATE AUTOMATED: CPT

## 2023-07-12 PROCEDURE — 80053 COMPREHEN METABOLIC PANEL: CPT

## 2023-07-12 PROCEDURE — 83520 IMMUNOASSAY QUANT NOS NONAB: CPT

## 2023-07-12 PROCEDURE — 82570 ASSAY OF URINE CREATININE: CPT

## 2023-07-12 PROCEDURE — 81001 URINALYSIS AUTO W/SCOPE: CPT

## 2023-07-12 PROCEDURE — 84156 ASSAY OF PROTEIN URINE: CPT

## 2023-07-12 PROCEDURE — 85025 COMPLETE CBC W/AUTO DIFF WBC: CPT

## 2023-07-12 PROCEDURE — 86037 ANCA TITER EACH ANTIBODY: CPT

## 2023-07-12 PROCEDURE — 86140 C-REACTIVE PROTEIN: CPT

## 2023-07-14 LAB
C-ANCA TITR SER IF: ABNORMAL TITER
MYELOPEROXIDASE AB SER IA-ACNC: <0.2 UNITS (ref 0–0.9)
P-ANCA ATYPICAL TITR SER IF: ABNORMAL TITER
P-ANCA TITR SER IF: ABNORMAL TITER
PROTEINASE3 AB SER IA-ACNC: 1.7 UNITS (ref 0–0.9)

## 2023-07-16 RX ORDER — SODIUM CHLORIDE, SODIUM LACTATE, POTASSIUM CHLORIDE, CALCIUM CHLORIDE 600; 310; 30; 20 MG/100ML; MG/100ML; MG/100ML; MG/100ML
75 INJECTION, SOLUTION INTRAVENOUS CONTINUOUS
Status: CANCELLED | OUTPATIENT
Start: 2023-07-16

## 2023-07-17 ENCOUNTER — ANESTHESIA (OUTPATIENT)
Dept: GASTROENTEROLOGY | Facility: AMBULARY SURGERY CENTER | Age: 43
End: 2023-07-17

## 2023-07-17 ENCOUNTER — ANESTHESIA EVENT (OUTPATIENT)
Dept: GASTROENTEROLOGY | Facility: AMBULARY SURGERY CENTER | Age: 43
End: 2023-07-17

## 2023-07-17 ENCOUNTER — HOSPITAL ENCOUNTER (OUTPATIENT)
Dept: GASTROENTEROLOGY | Facility: AMBULARY SURGERY CENTER | Age: 43
Setting detail: OUTPATIENT SURGERY
Discharge: HOME | End: 2023-07-17
Attending: INTERNAL MEDICINE
Payer: COMMERCIAL

## 2023-07-17 VITALS
DIASTOLIC BLOOD PRESSURE: 71 MMHG | SYSTOLIC BLOOD PRESSURE: 112 MMHG | RESPIRATION RATE: 18 BRPM | OXYGEN SATURATION: 99 % | HEIGHT: 69 IN | WEIGHT: 210.1 LBS | TEMPERATURE: 97.4 F | HEART RATE: 87 BPM | BODY MASS INDEX: 31.12 KG/M2

## 2023-07-17 DIAGNOSIS — D50.9 IRON DEFICIENCY ANEMIA, UNSPECIFIED IRON DEFICIENCY ANEMIA TYPE: ICD-10-CM

## 2023-07-17 PROBLEM — K21.9 GASTROESOPHAGEAL REFLUX DISEASE: Status: ACTIVE | Noted: 2023-07-17

## 2023-07-17 LAB
EXT PREGNANCY TEST URINE: NEGATIVE
EXT. CONTROL: NORMAL

## 2023-07-17 PROCEDURE — 88305 TISSUE EXAM BY PATHOLOGIST: CPT | Performed by: PATHOLOGY

## 2023-07-17 PROCEDURE — 43239 EGD BIOPSY SINGLE/MULTIPLE: CPT | Performed by: INTERNAL MEDICINE

## 2023-07-17 PROCEDURE — 81025 URINE PREGNANCY TEST: CPT | Performed by: ANESTHESIOLOGY

## 2023-07-17 PROCEDURE — 45378 DIAGNOSTIC COLONOSCOPY: CPT | Performed by: INTERNAL MEDICINE

## 2023-07-17 RX ORDER — ZINC GLUCONATE 50 MG
50 TABLET ORAL DAILY
COMMUNITY
End: 2023-07-17

## 2023-07-17 RX ORDER — VITAMIN B COMPLEX
1 CAPSULE ORAL DAILY
COMMUNITY

## 2023-07-17 RX ORDER — SODIUM CHLORIDE, SODIUM LACTATE, POTASSIUM CHLORIDE, CALCIUM CHLORIDE 600; 310; 30; 20 MG/100ML; MG/100ML; MG/100ML; MG/100ML
INJECTION, SOLUTION INTRAVENOUS CONTINUOUS PRN
Status: DISCONTINUED | OUTPATIENT
Start: 2023-07-17 | End: 2023-07-17

## 2023-07-17 RX ORDER — MULTIVIT WITH MINERALS/LUTEIN
1000 TABLET ORAL DAILY
COMMUNITY

## 2023-07-17 RX ORDER — PROPOFOL 10 MG/ML
INJECTION, EMULSION INTRAVENOUS AS NEEDED
Status: DISCONTINUED | OUTPATIENT
Start: 2023-07-17 | End: 2023-07-17

## 2023-07-17 RX ORDER — LIDOCAINE HYDROCHLORIDE 10 MG/ML
INJECTION, SOLUTION EPIDURAL; INFILTRATION; INTRACAUDAL; PERINEURAL AS NEEDED
Status: DISCONTINUED | OUTPATIENT
Start: 2023-07-17 | End: 2023-07-17

## 2023-07-17 RX ORDER — SODIUM CHLORIDE, SODIUM LACTATE, POTASSIUM CHLORIDE, CALCIUM CHLORIDE 600; 310; 30; 20 MG/100ML; MG/100ML; MG/100ML; MG/100ML
75 INJECTION, SOLUTION INTRAVENOUS CONTINUOUS
Status: DISCONTINUED | OUTPATIENT
Start: 2023-07-17 | End: 2023-07-21 | Stop reason: HOSPADM

## 2023-07-17 RX ORDER — PROPOFOL 10 MG/ML
INJECTION, EMULSION INTRAVENOUS CONTINUOUS PRN
Status: DISCONTINUED | OUTPATIENT
Start: 2023-07-17 | End: 2023-07-17

## 2023-07-17 RX ADMIN — PROPOFOL 200 MG: 10 INJECTION, EMULSION INTRAVENOUS at 13:42

## 2023-07-17 RX ADMIN — PROPOFOL 140 MCG/KG/MIN: 10 INJECTION, EMULSION INTRAVENOUS at 13:46

## 2023-07-17 RX ADMIN — LIDOCAINE HYDROCHLORIDE 50 MG: 10 INJECTION, SOLUTION EPIDURAL; INFILTRATION; INTRACAUDAL; PERINEURAL at 13:42

## 2023-07-17 RX ADMIN — SODIUM CHLORIDE, SODIUM LACTATE, POTASSIUM CHLORIDE, AND CALCIUM CHLORIDE 75 ML/HR: .6; .31; .03; .02 INJECTION, SOLUTION INTRAVENOUS at 12:42

## 2023-07-17 RX ADMIN — SODIUM CHLORIDE, SODIUM LACTATE, POTASSIUM CHLORIDE, AND CALCIUM CHLORIDE: .6; .31; .03; .02 INJECTION, SOLUTION INTRAVENOUS at 13:35

## 2023-07-17 RX ADMIN — PROPOFOL 100 MG: 10 INJECTION, EMULSION INTRAVENOUS at 13:44

## 2023-07-17 NOTE — ANESTHESIA POSTPROCEDURE EVALUATION
Post-Op Assessment Note    CV Status:  Stable  Pain Score: 0    Pain management: adequate     Mental Status:  Alert and awake   Hydration Status:  Euvolemic   PONV Controlled:  Controlled   Airway Patency:  Patent      Post Op Vitals Reviewed: Yes      Staff: CRNA         No notable events documented.     BP   102/68   Temp     Pulse 78   Resp 12   SpO2 98

## 2023-07-17 NOTE — H&P
History and Physical - [facility]  Marycarmen Phillips 36 y.o. female MRN: [id number]                  HPI: Marycarmen Phillips is a 36y.o. year old female who presents for iron deficiency anemia. REVIEW OF SYSTEMS: Per the HPI, and otherwise unremarkable.     Historical Information   Past Medical History:   Diagnosis Date   • Asthma    • Disease of thyroid gland    • Multiple lung nodules on CT 8/16/2019   • Shortness of breath 8/16/2019   • Thyroid cyst    • Wegener's granulomatosis    • Wegener's granulomatosis      Past Surgical History:   Procedure Laterality Date   • MOUTH SURGERY     • US GUIDED FINE NEEDLE ASPIRATION (ALL INC)  4/10/2014   • US GUIDED THYROID BIOPSY  2014     Social History   Social History     Substance and Sexual Activity   Alcohol Use Not Currently    Comment: social     Social History     Substance and Sexual Activity   Drug Use Never     Social History     Tobacco Use   Smoking Status Never   Smokeless Tobacco Never     Family History   Problem Relation Age of Onset   • Hypertension Mother    • No Known Problems Father    • No Known Problems Brother    • Stroke Maternal Grandmother    • Dementia Maternal Grandfather    • Diabetes Paternal Grandmother    • Dementia Paternal Grandfather    • No Known Problems Half-Sister    • No Known Problems Half-Sister    • No Known Problems Half-Sister    • No Known Problems Maternal Aunt    • No Known Problems Maternal Aunt    • No Known Problems Maternal Aunt    • No Known Problems Maternal Aunt    • No Known Problems Maternal Aunt    • No Known Problems Maternal Aunt    • No Known Problems Paternal Aunt    • No Known Problems Paternal Aunt    • No Known Problems Paternal Aunt    • Colon cancer Maternal Uncle 58   • Breast cancer Cousin 34   • BRCA 1/2 Cousin         unknown result       Meds/Allergies       Current Outpatient Medications:   •  Ascorbic Acid (vitamin C) 1000 MG tablet  •  b complex vitamins capsule  •  Biotin 10 MG CAPS  •  fluticasone (Flovent HFA) 110 MCG/ACT inhaler  •  Fluticasone Propionate 93 MCG/ACT EXHU  •  omeprazole (PriLOSEC) 40 MG capsule  •  albuterol (PROVENTIL HFA,VENTOLIN HFA) 90 mcg/act inhaler  •  folic acid (FOLVITE) 1 mg tablet  •  glucosamine-chondroitin 500-400 MG tablet  •  Multiple Vitamins-Minerals (multivitamin with minerals) tablet  •  sodium picosulfate, magnesium oxide, citric acid (Clenpiq) oral solution  •  valACYclovir (VALTREX) 500 mg tablet    Current Facility-Administered Medications:   •  lactated ringers infusion, 75 mL/hr, Intravenous, Continuous, 75 mL/hr at 07/17/23 1242    Allergies   Allergen Reactions   • Ibuprofen Hives   • Other Eye Swelling     seasonal       Objective     /65   Pulse 89   Temp (!) 97.4 °F (36.3 °C) (Skin)   Resp 18   Ht 5' 9" (1.753 m)   Wt 95.3 kg (210 lb 1.6 oz)   LMP 07/01/2023 (Approximate)   SpO2 99%   BMI 31.03 kg/m²       PHYSICAL EXAM    Gen: NAD  Head: NCAT  CV: RRR  CHEST: Clear  ABD: soft, NT/ND  EXT: no edema      ASSESSMENT/PLAN:  This is a 36y.o. year old female here for EGD  & colonoscopy, and she is stable and optimized for her procedure.

## 2023-07-17 NOTE — ANESTHESIA PREPROCEDURE EVALUATION
Procedure:  COLONOSCOPY  EGD    Relevant Problems   CARDIO   (+) Wegener's granulomatosis without renal involvement (HCC)      GI/HEPATIC   (+) Gastroesophageal reflux disease      HEMATOLOGY   (+) Iron deficiency anemia      NEURO/PSYCH   (+) Other headache syndrome      PULMONARY   (+) Mild intermittent asthma without complication        Physical Exam    Airway    Mallampati score: II  TM Distance: >3 FB  Neck ROM: full     Dental       Cardiovascular  Rhythm: regular, Rate: normal,     Pulmonary  Breath sounds clear to auscultation,     Other Findings        Anesthesia Plan  ASA Score- 2     Anesthesia Type- IV sedation with anesthesia with ASA Monitors. Additional Monitors:   Airway Plan:     Comment: Water on arrival - ok for about 1:30 pm.       Plan Factors-    Chart reviewed. Patient is not a current smoker. Induction- intravenous. Postoperative Plan-     Informed Consent- Anesthetic plan and risks discussed with patient. I personally reviewed this patient with the CRNA. Discussed and agreed on the Anesthesia Plan with the CRNA. Sarina Case

## 2023-07-20 PROCEDURE — 88305 TISSUE EXAM BY PATHOLOGIST: CPT | Performed by: PATHOLOGY

## 2023-08-10 ENCOUNTER — TELEPHONE (OUTPATIENT)
Dept: OBGYN CLINIC | Facility: CLINIC | Age: 43
End: 2023-08-10

## 2023-08-10 DIAGNOSIS — B37.9 YEAST INFECTION: Primary | ICD-10-CM

## 2023-08-10 RX ORDER — FLUCONAZOLE 150 MG/1
150 TABLET ORAL ONCE
Qty: 2 TABLET | Refills: 0 | Status: SHIPPED | OUTPATIENT
Start: 2023-08-10 | End: 2023-08-10

## 2023-08-10 NOTE — TELEPHONE ENCOUNTER
Patient called she has been on antibiotics for an extended period of time for an ear infection, ,  She would like a  A prescription for diflucan 2 pills called into the pharmacy [facility] on [address]  She know she has a yeast infection

## 2023-11-07 ENCOUNTER — HOSPITAL ENCOUNTER (OUTPATIENT)
Dept: RADIOLOGY | Age: 43
Discharge: HOME | End: 2023-11-07
Payer: COMMERCIAL

## 2023-11-07 DIAGNOSIS — Z12.31 ENCOUNTER FOR SCREENING MAMMOGRAM FOR BREAST CANCER: ICD-10-CM

## 2023-11-07 PROCEDURE — 77063 BREAST TOMOSYNTHESIS BI: CPT

## 2023-11-07 PROCEDURE — 77067 SCR MAMMO BI INCL CAD: CPT

## 2024-06-27 ENCOUNTER — ANNUAL EXAM (OUTPATIENT)
Dept: OBGYN CLINIC | Facility: CLINIC | Age: 44
End: 2024-06-27
Payer: COMMERCIAL

## 2024-06-27 VITALS
DIASTOLIC BLOOD PRESSURE: 78 MMHG | SYSTOLIC BLOOD PRESSURE: 118 MMHG | HEIGHT: 69 IN | BODY MASS INDEX: 30.36 KG/M2 | WEIGHT: 205 LBS

## 2024-06-27 DIAGNOSIS — Z01.419 ENCOUNTER FOR GYNECOLOGICAL EXAMINATION WITHOUT ABNORMAL FINDING: Primary | ICD-10-CM

## 2024-06-27 PROCEDURE — S0612 ANNUAL GYNECOLOGICAL EXAMINA: HCPCS | Performed by: PHYSICIAN ASSISTANT

## 2024-06-27 NOTE — PROGRESS NOTES
"Assessment/Plan:      Diagnoses and all orders for this visit:    Encounter for gynecological examination without abnormal finding  -     IGP, Aptima HPV, Rfx 16/18,45        Pap done.  Order for mammogram already in Epic.  Call if periods worsen or change.  If no problems, patient to return in 1 year for routine gyn care.    Subjective:     Patient ID: Dawna Apple is a 43 y.o. female.    Patient is here for yearly gyn exam.  States she is doing well overall.  Periods are regular every 28 days, and bleeding lasts for 5 days.  Flow is moderate; experiences back pain. Denies bowel/bladder changes, pelvic pain, bloating, abdominal pain, n/v, change in appetite, and thyroid disease.  Up to date on her colonoscopy.  Wegener's is under good control.    Mammogram is scheduled for November.  Patient is performing self-breast exam.  Denies new masses, skin changes, nipple discharge, and pain/tenderness.        Review of Systems   Constitutional:  Negative for appetite change and unexpected weight change.   Cardiovascular:         No masses, skin changes, nipple discharge, and pain/tenderness.   Gastrointestinal:  Negative for abdominal distention, abdominal pain, constipation, diarrhea, nausea and vomiting.   Genitourinary:  Negative for difficulty urinating, dysuria, frequency, genital sores, hematuria, menstrual problem, pelvic pain, urgency, vaginal bleeding, vaginal discharge and vaginal pain.         Objective:  Visit Vitals  /78 (BP Location: Left arm, Patient Position: Sitting, Cuff Size: Adult)   Ht 5' 9\" (1.753 m)   Wt 93 kg (205 lb)   BMI 30.27 kg/m²   OB Status Unknown   Smoking Status Never   BSA 2.09 m²         Physical Exam  Vitals reviewed. Exam conducted with a chaperone present.   Constitutional:       Appearance: Normal appearance. She is well-developed.   Neck:      Thyroid: No thyromegaly.   Pulmonary:      Effort: Pulmonary effort is normal.   Chest:   Breasts:     Breasts are symmetrical.      " Right: Normal. No swelling, bleeding, inverted nipple, mass, nipple discharge, skin change or tenderness.      Left: Normal. No swelling, bleeding, inverted nipple, mass, nipple discharge, skin change or tenderness.   Abdominal:      General: There is no distension.      Palpations: Abdomen is soft.      Tenderness: There is no abdominal tenderness.   Genitourinary:     General: Normal vulva.      Pubic Area: No rash.       Labia:         Right: No rash, tenderness, lesion or injury.         Left: No rash, tenderness, lesion or injury.       Vagina: Normal. No vaginal discharge, erythema, tenderness or bleeding.      Cervix: Normal.      Uterus: Normal.       Adnexa: Right adnexa normal and left adnexa normal.        Right: No mass, tenderness or fullness.          Left: No mass, tenderness or fullness.     Musculoskeletal:      Cervical back: Neck supple.   Lymphadenopathy:      Cervical: No cervical adenopathy.      Upper Body:      Right upper body: No supraclavicular or axillary adenopathy.      Left upper body: No supraclavicular or axillary adenopathy.      Lower Body: No right inguinal adenopathy. No left inguinal adenopathy.   Skin:     General: Skin is warm and dry.   Neurological:      Mental Status: She is alert and oriented to person, place, and time.   Psychiatric:         Behavior: Behavior normal. Behavior is cooperative.         Thought Content: Thought content normal.         Judgment: Judgment normal.

## 2024-07-03 LAB
CYTOLOGIST CVX/VAG CYTO: NORMAL
DX ICD CODE: NORMAL
HPV GENOTYPE REFLEX: NORMAL
HPV I/H RISK 4 DNA CVX QL PROBE+SIG AMP: NEGATIVE
OTHER STN SPEC: NORMAL
PATH REPORT.FINAL DX SPEC: NORMAL
SL AMB NOTE:: NORMAL
SL AMB SPECIMEN ADEQUACY: NORMAL
SL AMB TEST METHODOLOGY: NORMAL

## 2024-08-28 ENCOUNTER — TELEPHONE (OUTPATIENT)
Age: 44
End: 2024-08-28

## 2024-08-28 NOTE — TELEPHONE ENCOUNTER
Patient called in asking if she could be scheduled for a flu and covid shot. Advised patient they were not in yet, however her local pharmacy may have them if she did not want to wait. Patient had asked if we could let her know once we receive them.    Please advise, thank you

## 2024-11-08 ENCOUNTER — HOSPITAL ENCOUNTER (OUTPATIENT)
Dept: RADIOLOGY | Age: 44
Discharge: HOME | End: 2024-11-08
Payer: COMMERCIAL

## 2024-11-08 VITALS — WEIGHT: 205 LBS | HEIGHT: 69 IN | BODY MASS INDEX: 30.36 KG/M2

## 2024-11-08 DIAGNOSIS — Z12.31 ENCOUNTER FOR SCREENING MAMMOGRAM FOR BREAST CANCER: ICD-10-CM

## 2024-11-08 PROCEDURE — 77063 BREAST TOMOSYNTHESIS BI: CPT

## 2024-11-08 PROCEDURE — 77067 SCR MAMMO BI INCL CAD: CPT

## 2024-11-11 ENCOUNTER — HOSPITAL ENCOUNTER (OUTPATIENT)
Dept: RADIOLOGY | Facility: HOSPITAL | Age: 44
Discharge: HOME | End: 2024-11-11
Payer: COMMERCIAL

## 2024-11-11 ENCOUNTER — OFFICE VISIT (OUTPATIENT)
Dept: FAMILY MEDICINE CLINIC | Facility: CLINIC | Age: 44
End: 2024-11-11
Payer: COMMERCIAL

## 2024-11-11 VITALS
HEART RATE: 99 BPM | HEIGHT: 69 IN | DIASTOLIC BLOOD PRESSURE: 80 MMHG | OXYGEN SATURATION: 96 % | BODY MASS INDEX: 31.4 KG/M2 | RESPIRATION RATE: 16 BRPM | SYSTOLIC BLOOD PRESSURE: 122 MMHG | TEMPERATURE: 98 F | WEIGHT: 212 LBS

## 2024-11-11 DIAGNOSIS — B00.2 HERPES STOMATITIS: ICD-10-CM

## 2024-11-11 DIAGNOSIS — A60.00 HERPES SIMPLEX INFECTION OF GENITOURINARY SYSTEM: ICD-10-CM

## 2024-11-11 DIAGNOSIS — M54.41 ACUTE BILATERAL LOW BACK PAIN WITH RIGHT-SIDED SCIATICA: ICD-10-CM

## 2024-11-11 DIAGNOSIS — Z23 NEED FOR COVID-19 VACCINE: Primary | ICD-10-CM

## 2024-11-11 DIAGNOSIS — J45.20 MILD INTERMITTENT ASTHMA WITHOUT COMPLICATION: ICD-10-CM

## 2024-11-11 DIAGNOSIS — K21.9 GASTROESOPHAGEAL REFLUX DISEASE WITHOUT ESOPHAGITIS: ICD-10-CM

## 2024-11-11 DIAGNOSIS — Z00.00 ANNUAL PHYSICAL EXAM: ICD-10-CM

## 2024-11-11 DIAGNOSIS — B00.9 HERPES: ICD-10-CM

## 2024-11-11 DIAGNOSIS — M31.30 WEGENER'S GRANULOMATOSIS WITHOUT RENAL INVOLVEMENT (HCC): ICD-10-CM

## 2024-11-11 PROBLEM — D50.9 IRON DEFICIENCY ANEMIA: Status: RESOLVED | Noted: 2021-08-30 | Resolved: 2024-11-11

## 2024-11-11 PROBLEM — R91.1 PULMONARY NODULE: Status: RESOLVED | Noted: 2020-12-22 | Resolved: 2024-11-11

## 2024-11-11 PROBLEM — M54.50 ACUTE BILATERAL LOW BACK PAIN: Status: ACTIVE | Noted: 2024-11-11

## 2024-11-11 PROCEDURE — 73502 X-RAY EXAM HIP UNI 2-3 VIEWS: CPT

## 2024-11-11 PROCEDURE — 90480 ADMN SARSCOV2 VAC 1/ONLY CMP: CPT | Performed by: FAMILY MEDICINE

## 2024-11-11 PROCEDURE — 91320 SARSCV2 VAC 30MCG TRS-SUC IM: CPT | Performed by: FAMILY MEDICINE

## 2024-11-11 PROCEDURE — 99396 PREV VISIT EST AGE 40-64: CPT | Performed by: FAMILY MEDICINE

## 2024-11-11 PROCEDURE — 72110 X-RAY EXAM L-2 SPINE 4/>VWS: CPT

## 2024-11-11 PROCEDURE — 99214 OFFICE O/P EST MOD 30 MIN: CPT | Performed by: FAMILY MEDICINE

## 2024-11-11 RX ORDER — TRAMADOL HYDROCHLORIDE 50 MG/1
50 TABLET ORAL EVERY 6 HOURS PRN
Qty: 30 TABLET | Refills: 0 | Status: SHIPPED | OUTPATIENT
Start: 2024-11-11

## 2024-11-11 RX ORDER — VALACYCLOVIR HYDROCHLORIDE 500 MG/1
500 TABLET, FILM COATED ORAL AS NEEDED
Status: CANCELLED | OUTPATIENT
Start: 2024-11-11 | End: 2025-02-09

## 2024-11-11 RX ORDER — METHOCARBAMOL 500 MG/1
500 TABLET, FILM COATED ORAL 3 TIMES DAILY
Qty: 90 TABLET | Refills: 0 | Status: SHIPPED | OUTPATIENT
Start: 2024-11-11

## 2024-11-11 RX ORDER — VALACYCLOVIR HYDROCHLORIDE 500 MG/1
500 TABLET, FILM COATED ORAL 3 TIMES DAILY
Qty: 90 TABLET | Refills: 3 | Status: SHIPPED | OUTPATIENT
Start: 2024-11-11 | End: 2024-11-18

## 2024-11-11 NOTE — ASSESSMENT & PLAN NOTE
MUscle relaxant therapy  needs note work from home     Orders:    Ambulatory Referral to Physical Therapy; Future    XR spine lumbar minimum 4 views non injury; Future    XR hip/pelv 2-3 vws right if performed; Future    traMADol (Ultram) 50 mg tablet; Take 1 tablet (50 mg total) by mouth every 6 (six) hours as needed for moderate pain    methocarbamol (ROBAXIN) 500 mg tablet; Take 1 tablet (500 mg total) by mouth 3 (three) times a day

## 2024-11-11 NOTE — ASSESSMENT & PLAN NOTE
[address], [address]  [facility] [address]  Phone: [phone]  Primary Provider: Ellis Ruiz        PREOPERATIVE EVALUATION:  Today's date: 11/15/2021    Cecily Ivory is a 78 year old female who presents for a preoperative evaluation.    Surgical Information:  Surgery/Procedure: RIGHT TOTAL KNEE ARTHROPLASTY  Surgery Location:  OR  Surgeon: Earl Perez MD  Surgery Date: 12/9/21  Time of Surgery: 1 pm  Where patient plans to recover: At home with family  Fax number for surgical facility: in [facility]    Type of Anesthesia Anticipated: Spinal        Subjective     HPI related to upcoming procedure: This is a very pleasant 78-year-old female having surgery for DJD of her knee.  She otherwise feels very well.  She has a history of Takotsubo cardiomyopathy but this is resolved.  She has no chest pain or shortness of breath.  She has not been overly active.  She does have a history of mild memory loss as noted.  She had a recent physical and her labs are noted in the chart.                Past Medical History:      Past Medical History:   Diagnosis Date     Abdominal pain 2009, 2013    ct liver and renal cyst and 2mm lung nodule, repeat ct done 2013 and no significantly abnl.     Anxiety 2014    added med 3/14     Arthritis      ASCUS of cervix with negative high risk HPV 03/2018     Bilateral subdural hematomas (H) 09/2020    found incidentally on mri done for memory loss     Carotid bruit 2011    us nl     Gastritis 2011    egd done by [facility]     GERD (gastroesophageal reflux disease) 2011     Gross hematuria 2021    cysto and ct neg     History of colonoscopy 2004, 2014    nl     Hypercholesteremia 2000    stopped lovastatin 2015, added lipitor 3/16     Insomnia     using otc      Lumbar spinal stenosis 2016    Dr. Wilde, had epidural     Lung nodule 2009, 2013    seen on ct for abd pain, fu done 3/13 and 2 nodules stable and benign, one new one needs fu 1  Ok no recent flare ups          year.  fu done 3/14 and fu 1 year and then done; fu done 3/15 and unchanged, no fu needed     MCI (mild cognitive impairment)     seen by neuro, Dr. Burnham     Myocardial infarction (H) 2020    elev trop but clean coronaries on angio, felt to be stress induced cmyop     Odynophagia     egd nl     Other chronic pain      Peripheral neuropathies     Dr. Kim     Screening ,     nl dexa     Seasonal allergies      Takotsubo cardiomyopathy 2020    hosp fsd, ef 40%, mod ai, clean coronaries; fu echo nl      Vaginal dysplasia     chronic VAIN I, many colpos of vagina.  Now we only do an annual pap of vagina, no colpo since stable long term     Weight loss 2018    ct chest, abd and pelvis neg             Past Surgical History:      Past Surgical History:   Procedure Laterality Date     ANKLE SURGERY       APPENDECTOMY  13     ARTHROPLASTY KNEE Left 2018    Procedure: ARTHROPLASTY KNEE;  Left total knee arthroplasty;  Surgeon: William Pollard MD;  Location: [facility] OR     BREAST BIOPSY, CORE RT/LT       C VAGINAL HYSTERECTOMY       CATARACT IOL, RT/LT  2019     CV HEART CATHETERIZATION WITH POSSIBLE INTERVENTION N/A 2020    Procedure: Heart Catheterization with Possible Intervention;  Surgeon: Bibiana Ruggiero MD;  Location:  HEART CARDIAC CATH LAB     GYN SURGERY      BSO     HYSTERECTOMY, PAP NO LONGER INDICATED       LAPAROSCOPIC CHOLECYSTECTOMY       NOSE SURGERY       right knee arthroscopy       thumb surgery Left 2015     [facility] UGI ENDOSCOPY, SIMPLE EXAM  03/15/11    [facility]             Social History:     Social History     Tobacco Use     Smoking status: Former Smoker     Packs/day: 1.50     Years: 20.00     Pack years: 30.00     Types: Cigarettes     Quit date: 1992     Years since quittin.7     Smokeless tobacco: Never Used   Substance Use Topics     Alcohol use: Yes     Alcohol/week: 2.0 - 4.0 standard drinks      "Types: 2 - 4 Glasses of wine per week     Comment: 2 glasses 4 times per week             Family History:   No family history of bleeding difficulty, anesthesia problems or blood clots.         Allergies:     Allergies   Allergen Reactions     Latex Cough     Seasonal Allergies      YEAR ROUND ALLERGIES     Sulfa Drugs Unknown             Medications:     Current Outpatient Medications   Medication Sig Dispense Refill     ASPIRIN NOT PRESCRIBED (INTENTIONAL) Please choose reason not prescribed from choices below.       Ascorbic Acid (VITAMIN C PO) Take 500 mg by mouth daily.       Biotin 5000 MCG CAPS Take 1 tablet by mouth At Bedtime        calcium carb 1250 mg, 500 mg [place],/vitamin D 200 units (OSCAL WITH D) 500-200 MG-UNIT per tablet Take 1 tablet by mouth daily  100 tablet 3     Carboxymethylcellulose Sodium (THERATEARS) 0.25 % SOLN Place 1 drop into both eyes every evening       Cholecalciferol (VITAMIN D3 PO) Take 1,000 Units by mouth daily        CYANOCOBALAMIN PO Take 1,000 mcg by mouth daily       loratadine (CLARITIN) 10 MG tablet Take 10 mg by mouth daily        lovastatin (MEVACOR) 40 MG tablet Take 1 tablet (40 mg) by mouth At Bedtime 90 tablet 3     magnesium 100 MG CAPS Take 1 tablet by mouth daily        metoprolol succinate ER (TOPROL-XL) 25 MG 24 hr tablet Take 1 tablet (25 mg) by mouth daily 90 tablet 3     Multiple Vitamins-Minerals (CENTRUM SILVER) per tablet Take 1 tablet by mouth daily       sertraline (ZOLOFT) 25 MG tablet Take 1 tablet (25 mg) by mouth daily 90 tablet 3               Review of Systems:   No history of bleeding difficulty, anesthesia problems or blood clots.  The 10 point Review of Systems is negative other than noted in the HPI           Physical Exam:   Blood pressure 133/79, pulse 53, temperature 97  F (36.1  C), temperature source Tympanic, resp. rate 16, height 1.702 m (5' 7\"), weight 71.7 kg (158 lb), not currently breastfeeding.    Constitutional: healthy appearing, " alert and in no distress  Heent: Normocephalic. Head without obvious masses or lesions. PERRLDC, EOMI. Mouth exam within normal limits: tongue, mucous membranes, posterior pharynx all normal, no lesions or abnormalities seen.  Tm's and canals within normal limits bilaterally. Neck supple, no nuchal rigidity or masses. No supraclavicular, or cervical adenopathy. Thyroid symmetric, no masses.  Cardiovascular: Regular rate and rhythm, no murmer, rub or gallops.  JVP not elevated, no edema.  Carotids within normal limits bilaterally, no bruits.  Respiratory: Normal respiratory effort.  Lungs clear, normal flow, no wheezing or crackles.  Gastrointestinal: Normal active bowel sounds.   Soft, not tender, no masses, guarding or rebound.  No hepatosplenomegaly.   Musculoskeletal: extremities normal, no gross deformities noted.  Skin: no suspicious lesions or rashes   Neurologic: Mental status within normal limits.  Speech fluent.  No gross motor abnormalities and gait intact.  Psychiatric: mentation appears normal and affect normal.         Data:   Labs noted; ekg - sinus ridge, nsivcd, no change from old        Assessment:   1. Normal pre op exam, this patient should be at low cardiopulmonary risk for the procedure.  Her cardiomyopathy has resolved.  Her angiogram was clear.  She does have mild memory loss and this could contribute to some confusion postop so I would be careful with narcotics and be sure to get the patient up as soon as possible.  2. Mild memory loss, as above           Plan:   The patient is ok for the procedure  Stop her aspirin, not needed  Careful with medication including narcotics postop.  Please ambulate patient as soon as possible and reorient her frequently.  She has been drinking 2 morning at night that are small but I have suggested she cut down to just 1 at night at the most prior to surgery.  She can continue her usual home medications on the day of surgery  Routine postop DVT  prophylaxis      Ellis Ruiz M.D.

## 2024-11-11 NOTE — PROGRESS NOTES
Ambulatory Visit  Name: Dawna Apple      : 1980      MRN: [id number]  Encounter Provider: Essence Deluna MD  Encounter Date: 2024   Encounter department: [facility] MEDICINE    Assessment & Plan  Herpes    Orders:    valACYclovir (VALTREX) 500 mg tablet; Take 1 tablet (500 mg total) by mouth 3 (three) times a day for 7 days    Need for COVID-19 vaccine    Orders:    COVID-19 Pfizer mRNA vaccine 12 yr and older (Comirnaty pre-filled syringe)    Mild intermittent asthma without complication  Ok no recent flare ups         Gastroesophageal reflux disease without esophagitis  Ok on omeprazole          Wegener's granulomatosis without renal involvement (HCC)  Pt followed by rheumatology         Annual physical exam  Had cmp cbc both ok     Orders:    Lipid panel; Future    TSH, 3rd generation; Future    Acute bilateral low back pain with right-sided sciatica  MUscle relaxant therapy  needs note work from home     Orders:    Ambulatory Referral to Physical Therapy; Future    XR spine lumbar minimum 4 views non injury; Future    XR hip/pelv 2-3 vws right if performed; Future    traMADol (Ultram) 50 mg tablet; Take 1 tablet (50 mg total) by mouth every 6 (six) hours as needed for moderate pain    methocarbamol (ROBAXIN) 500 mg tablet; Take 1 tablet (500 mg total) by mouth 3 (three) times a day    Herpes stomatitis  Valtrex 2gm bid  prn         Herpes simplex infection of genitourinary system  Valtrex prn             History of Present Illness     Patient here for annual physical has had back pain for 2 weeks now radiating to right thigh has had on off back pain 2 weeks ago was raking leaves  using ibuprofen and heat pt has problems with right leg pain in hip to thigh     Back Pain  Pertinent negatives include no abdominal pain, chest pain, fever, headaches, numbness or weakness.         Review of Systems   Constitutional:  Negative for appetite change, chills, fatigue and fever.  "  Respiratory:  Negative for cough, chest tightness and shortness of breath.    Cardiovascular:  Negative for chest pain, palpitations and leg swelling.   Gastrointestinal:  Negative for abdominal pain, constipation, diarrhea, nausea and vomiting.   Genitourinary:  Negative for difficulty urinating and frequency.   Musculoskeletal:  Positive for back pain. Negative for arthralgias, gait problem and neck pain.   Skin:  Negative for rash.   Neurological:  Negative for dizziness, weakness, light-headedness, numbness and headaches.   Hematological:  Does not bruise/bleed easily.   Psychiatric/Behavioral:  Negative for dysphoric mood and sleep disturbance. The patient is not nervous/anxious.            Objective     /80 (BP Location: Left arm, Patient Position: Sitting, Cuff Size: Standard)   Pulse 99   Temp 98 °F (36.7 °C) (Tympanic)   Resp 16   Ht 5' 9\" (1.753 m)   Wt 96.2 kg (212 lb)   LMP 10/31/2024 (Exact Date)   SpO2 96%   BMI 31.31 kg/m²     Physical Exam  Vitals and nursing note reviewed.   Constitutional:       General: She is not in acute distress.     Appearance: Normal appearance. She is well-developed and normal weight.   HENT:      Head: Normocephalic and atraumatic.      Right Ear: Tympanic membrane, ear canal and external ear normal.      Left Ear: Tympanic membrane, ear canal and external ear normal.      Nose: Nose normal.      Mouth/Throat:      Mouth: Mucous membranes are moist.      Pharynx: Oropharynx is clear. No oropharyngeal exudate or posterior oropharyngeal erythema.   Eyes:      Extraocular Movements: Extraocular movements intact.      Conjunctiva/sclera: Conjunctivae normal.      Pupils: Pupils are equal, round, and reactive to light.   Cardiovascular:      Rate and Rhythm: Normal rate and regular rhythm.      Pulses: Normal pulses.      Heart sounds: Normal heart sounds. No murmur heard.  Pulmonary:      Effort: Pulmonary effort is normal. No respiratory distress.      Breath " sounds: Normal breath sounds. No wheezing or rales.   Abdominal:      General: Bowel sounds are normal. There is no distension.      Palpations: Abdomen is soft. There is no mass.      Tenderness: There is no abdominal tenderness. There is no right CVA tenderness, left CVA tenderness, guarding or rebound.      Hernia: No hernia is present.   Musculoskeletal:         General: Tenderness (bilateral lumbar paraspinal muscles) present. No swelling. Normal range of motion.      Cervical back: Normal range of motion and neck supple.      Right lower leg: No edema.      Left lower leg: No edema.   Skin:     General: Skin is warm and dry.      Capillary Refill: Capillary refill takes less than 2 seconds.      Findings: No rash.      Comments: No abn  moles seen   Neurological:      General: No focal deficit present.      Mental Status: She is alert and oriented to person, place, and time.      Cranial Nerves: No cranial nerve deficit.      Sensory: No sensory deficit.      Motor: No weakness.      Coordination: Coordination normal.      Gait: Gait normal.      Deep Tendon Reflexes: Reflexes normal.   Psychiatric:         Mood and Affect: Mood normal.         Behavior: Behavior normal.

## 2024-11-19 ENCOUNTER — RESULTS FOLLOW-UP (OUTPATIENT)
Dept: FAMILY MEDICINE CLINIC | Facility: CLINIC | Age: 44
End: 2024-11-19

## 2024-11-19 LAB
CHOLEST SERPL-MCNC: 211 MG/DL
CHOLEST/HDLC SERPL: 5.6 (CALC)
HDLC SERPL-MCNC: 38 MG/DL
LDLC SERPL CALC-MCNC: 139 MG/DL (CALC)
NONHDLC SERPL-MCNC: 173 MG/DL (CALC)
TRIGL SERPL-MCNC: 200 MG/DL
TSH SERPL-ACNC: 1.58 MIU/L

## 2024-11-19 NOTE — PROGRESS NOTES
PT Evaluation     Today's date: 2024  Patient name: Dawna Apple  : 1980  MRN: [id number]  Referring provider: Essence Deluna MD  Dx:   Encounter Diagnosis     ICD-10-CM    1. Acute bilateral low back pain with right-sided sciatica  M54.41 Ambulatory Referral to Physical Therapy          Start Time: 1545  Stop Time: 1634  Total time in clinic (min): 49 minutes    Assessment  Impairments: abnormal gait, abnormal or restricted ROM, abnormal movement, activity intolerance, impaired physical strength, lacks appropriate home exercise program, pain with function, weight-bearing intolerance, poor posture , poor body mechanics, unable to perform ADL, participation limitations, activity limitations and endurance  Symptom irritability: high    Assessment details: Dawna Apple is a pleasant 43 y.o. female with a referred dx of acute bilateral low back pain with right-sided sciatica from Essence Deluna MD.  No further referral appears necessary at this time based upon examination results. Upon further clinical testing, patient presents with the following deficits: lumbopelvic mechanical derangement, pain with end-range lumbar extension and side-bending motion, limited hip mobility, poor postural neuromuscular control/endurance, and decrease lumbopelvic stabilization. These deficits and impairments result in pain and inability to maximally perform ADLS, self-care, and difficulty with bed mobility. 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  Pt was provided with a basic HEP focused on lumbopelvic mobility and graded exposure which will be reviewed in the upcoming session. Pt able to demonstrate HEP with good technique and no pain. Educated pt to stop any exercises causing pain increase, pt verbalizes understanding. Pt was educated on anatomy and physiology of diagnosis and demonstrated verbal understanding. Positive prognostic indicators include positive  attitude toward recovery, good understanding of diagnosis and treatment plan options, acuity of symptoms, and absence of observed red flags. Negative prognostic indicators include high symptom irritability. Pt would benefit from skilled OP physical therapy to address these, and the below impairments in order to optimize outcomes and promote return to functional baseline.     Patient verbalized understanding of POC.    Please contact me if you have any questions or recommendations. Thank you for the referral and the opportunity to share in Dawna's care      Barriers to intervention comments: prolonged sitting environment  Understanding of Dx/Px/POC: good     Prognosis: fair    Goals    Short Term Goals:  In 2-4 weeks, the patient will:  1. Pt will report at least a 25% reduction in subjective pain complaints/symptoms to better manage ADLs and household chores.   2. Pt will reported 25% improvement in her lumbar ROM  3. Pt independent with initial HEP, rationale, technique and frequency, for ROM and pain control.  4. Increase tolerance with pelvic tilting      Long Term Goals:  In 6+ weeks, the patient will:  1. Pt will have WFL lumbar and hip ROM  2. FOTO to greater than predicted value.  3. Independent with comprehensive HEP upon discharge.  4. Pt will be able to perform ADLs, iADLS, and household duties with minimal restriction indicating return to PLOF.  5. Pt independent with rationale, technique and plan for performance of advanced HEP to ensure independent self-management of symptoms upon discharge.      Plan  Patient would benefit from: PT eval and skilled physical therapy  Referral necessary: No  Planned modality interventions: cryotherapy, biofeedback and TENS    Planned therapy interventions: activity modification, ADL retraining, joint mobilization, manual therapy, massage, behavior modification, body mechanics training, muscle pump exercises, nerve gliding, neuromuscular re-education, patient education,  postural training, community reintegration, self care, sensory integrative techniques, strengthening, stretching, therapeutic activities, therapeutic exercise, therapeutic training, home exercise program, graded motor, graded exercise, graded activity, functional ROM exercises, abdominal trunk stabilization, patient/caregiver education and transfer training    Frequency: 1-2x week  Plan of Care beginning date: 2024  Plan of Care expiration date: 1/15/2025  Treatment plan discussed with: patient        Subjective Evaluation    History of Present Illness  Mechanism of injury: Patient presents for PT initial evaluation regarding concerns of worsening of acute low back pain that began approx 2 weeks ago. She states it began to radiate into right anterior thigh after raking leaves/cleaning the yard for a full day earlier this month. Since then she has been using NSAIDs, muscle relaxant and heat to relieve pain. 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 Today she is doing better overall.    Aggravating Factors: walking, stair climbing, prolonged sitting, STS  Relieving Factors: laying supine still    Personal Functional Goals: be able to perform ADLS, cooking, decorating             Not a recurrent problem   Quality of life: good    Patient Goals  Patient goals for therapy: increased strength, independence with ADLs/IADLs, return to sport/leisure activities, increased motion and decreased pain    Pain  Current pain ratin  At best pain ratin  At worst pain rating: 10  Location: LBP to Right Thigh  Quality: sharp, radiating, dull ache, discomfort and knife-like  Relieving factors: change in position and medications  Aggravating factors: lifting, sitting, standing, walking and stair climbing    Social Support  Stairs in house: yes   Lives with: spouse    Employment status: working  Hand dominance: right      Diagnostic  Tests  X-ray: normal  Treatments  Previous treatment: medication        Objective      Postural Findings:     Head Position  Protracted x Neutral  Retracted   Scapular Position  Protracted x Neutral  Retracted   Thoracic Spine  Inc Kyphosis x Neutral     Lumbar Spine  Inc Lordosis  Neutral  Dec Lordosis   Pelvis x Anterior Tilt  Neutral  Posterior Tilt     Strength and ROM evaluated B from a regional biomechanical perspective and values relevant to this episode recorded in tables below.    ROM:   Joint / Motion  Right  Left    Lumbar Flexion  WFL     Lumbar Extension  10% P!     Lumbar Sidebending  25% P! WFL    Lumbar Rotation 25% P! WFL   Hip ER  Mild Limited Mod Limited   Hip IR  Mild Limitation Mod Liomited     Repeated Movements:    Standing Baseline:       DURING MVMT.  RESPONSE AFTER  Standing Flexion NC NC   Standing Extension Worsens Worsens     Lying Baseline:       DURING MVMT.  RESPONSE AFTER  Lying Extension Unable to tolerate        LE MMT Strength:  Test Action RIGHT  LEFT   Hip Flexion 5/5 5/5   Knee Extension 5/5 5/5   Knee Flexion 5/5 5/5   Ankle DF 5/5 5/5   Ankle PF 5/5 5/5     Additional Assessments:  Passive intervertebral motion: No P! Or reproduction of symptoms with lumbar/sacral PT OP  Joint Mobility: Hip hypomobility    NEURO Screen  Reflexes  Right Left   Patellar (L3-L4) 2+ 2+   Achilles (S1-S2) 2+ 2+   Gonzalez's N N   Clonus N N        LE Myotomes:  Nerve root Test Action RIGHT LEFT   L1-L2 Hip Flexion intact intact   L3 Knee Extension intact intact   L4  Ankle DF intact intact   L5 Great toe Extension intact intact   S1 Ankle PF, Ankle Eversion, Hip Extension, Knee flexion intact intact   S2 Ankle PF intact intact       LE Dermatomes: Protective sensation intact bilaterally    Special Tests:  Lumbar Specific and Neural Tension                                                                            Test / Measure  Right 11/20/2024 Left 11/20/2024   Straight Leg raise P!  N   Ely's  test N N   LOGAN P! P! In right   FADIR N N   Leg Length Discrepancy (< 1 cm WNL, 1.5-2cm is pos) NV NV          Precautions: hx of GERD    POC expires Unit limit Auth Expiration date PT/OT + Visit Limit?   1/15/25 3  MC Pend BOMN PCY         Visit/Unit Tracking  AUTH Status:  Date 11/20/2024        Pend Used 1         Remaining             Pertinent Findings:      Test / Measure  11/20/2024   FOTO (Predicted 64) 36   Lumbar AROM 10-25% for R SB and Extension + P!   Hip ROM Mild to Mod Limitation Bilaterally   L > R         Access Code: [email or web address]  URL: [email or web address]  Date: 11/20/2024  Prepared by: Nisha Finley    Exercises  - Supine Lower Trunk Rotation  - 1-2 x daily - 7 x weekly - 2 sets - 10 reps  - Supine Piriformis Stretch with Foot on Ground  - 1 x daily - 7 x weekly - 3-4 sets - 1 reps - 20 second hold  - Supine Posterior Pelvic Tilt  - 1 x daily - 7 x weekly - 2 sets - 10 reps    Manuals 11/20            Hip PROM             Bilateral Hip Distraction                                       Neuro Re-Ed             HEP/Patient Education/PT Diagnosis AR 23'            LTR on pball             Hooklying TA Pball Press with Bridge              Hookyling TA Pball Press                                                                 Ther Ex             Treadmill Walking             Hip Adduction Iso             Hip Hiking with Ball             SL Hip Abduction             Side-Lying Hip Flexion to Extension                                                                 Ther Activity                                       Gait Training                                       Modalities

## 2024-11-20 ENCOUNTER — EVALUATION (OUTPATIENT)
Dept: PHYSICAL THERAPY | Facility: REHABILITATION | Age: 44
End: 2024-11-20
Payer: COMMERCIAL

## 2024-11-20 DIAGNOSIS — M54.41 ACUTE BILATERAL LOW BACK PAIN WITH RIGHT-SIDED SCIATICA: ICD-10-CM

## 2024-11-20 PROCEDURE — 97161 PT EVAL LOW COMPLEX 20 MIN: CPT

## 2024-11-20 PROCEDURE — 97112 NEUROMUSCULAR REEDUCATION: CPT

## 2024-11-27 ENCOUNTER — OFFICE VISIT (OUTPATIENT)
Dept: PHYSICAL THERAPY | Facility: REHABILITATION | Age: 44
End: 2024-11-27
Payer: COMMERCIAL

## 2024-11-27 DIAGNOSIS — M54.41 ACUTE BILATERAL LOW BACK PAIN WITH RIGHT-SIDED SCIATICA: Primary | ICD-10-CM

## 2024-11-27 DIAGNOSIS — E78.00 HIGH CHOLESTEROL: Primary | ICD-10-CM

## 2024-11-27 PROCEDURE — 97140 MANUAL THERAPY 1/> REGIONS: CPT

## 2024-11-27 PROCEDURE — 97110 THERAPEUTIC EXERCISES: CPT

## 2024-11-27 NOTE — PROGRESS NOTES
Daily Note     Today's date: 2024  Patient name: Dawna Apple  : 1980  MRN: [id number]  Referring provider: Essence Deluna MD  Dx:   Encounter Diagnosis     ICD-10-CM    1. Acute bilateral low back pain with right-sided sciatica  M54.41           Start Time: 1615  Stop Time: 1700  Total time in clinic (min): 45 minutes      Subjective: Patient had significant lower back pain and anterior right hip pain over the last 3 days after driving (3 hours back and forth) to [employer]. Pain cause significant difficulty with walking and inability to sleep.       Objective: See treatment diary below      Assessment: Patient tolerated treatment session fair today. Patient had increase right hip pain during PROM, particularly going from flexed position into extension. Pain refers from anterior hip into knee, at times wrapping into lumbar region. Patient does have relief during hip long axis joint distraction and extremity support, anticipated potential hip joint involvement or pathology. She had marked difference between strength capacity of R hip musculature vs L. Patient did have increase hip pain at the end of the visit. Modified HEP due to increase irritability with some of the exercise. Recommended to ice if needed. Patient will continue to be appropriate for skilled outpatient physical therapy in order to address impairments. 1:1 with Nisha Finley, PT, DPT for entirety of treatment session.      Plan: Continue per plan of care.      Precautions: hx of GERD    POC expires Unit limit Auth Expiration date PT/OT + Visit Limit?   1/15/25 3  MC 24 BOMN PCY         Visit/Unit Tracking  AUTH Status:  Date        6 Visits Used 1 2        Remaining  5 4          Pertinent Findings:      Test / Measure  2024   FOTO (Predicted 64) 36   Lumbar AROM 10-25% for R SB and Extension + P!   Hip ROM Mild to Mod Limitation Bilaterally   L > R         Access Code: [email or web address]  URL:  [email or web address]  Date: 11/27/2024  Prepared by: Nisha Finley    Exercises  - Supine Lower Trunk Rotation  - 1-2 x daily - 7 x weekly - 2 sets - 10 reps  - Supine Piriformis Stretch with Foot on Ground  - 1 x daily - 7 x weekly - 3-4 sets - 1 reps - 20 second hold  - Supine Posterior Pelvic Tilt  - 1 x daily - 7 x weekly - 2 sets - 10 reps  - Bent Knee Fallouts  - 1 x daily - 7 x weekly - 2-3 sets - 10 reps  - Sidelying Hip Abduction  - 1 x daily - 7 x weekly - 2 sets - 10 reps    Manuals 11/20 11/27           Hip PROM  AR           Bilateral Hip Distraction  AR                                     Neuro Re-Ed             HEP/Patient Education/PT Diagnosis AR 23' Updated            LTR on pball  With hip iso to ball   20x ea           Reverse Crunch (BKTC) with Hip Adduction Iso  20x           Hooklying TA Pball Press with Bridge              Hookyling TA Pball Press  5\"x20           90/90 Hamstring Iso Pball to Wall  5\"x20                                                  Ther Ex             Treadmill Walking  6'           Hooklying Unilateral Clamshells  #0  20x  ea           Hip Adduction Iso  Ball  5\"x30           Hip Hiking with Ball  np           SL Hip Abduction  3x10  eac           Side-Lying Hip Flexion to Extension                                                                 Ther Activity                                       Gait Training                                       Modalities                                            "

## 2024-12-04 ENCOUNTER — OFFICE VISIT (OUTPATIENT)
Dept: PHYSICAL THERAPY | Facility: REHABILITATION | Age: 44
End: 2024-12-04
Payer: COMMERCIAL

## 2024-12-04 DIAGNOSIS — M54.41 ACUTE BILATERAL LOW BACK PAIN WITH RIGHT-SIDED SCIATICA: Primary | ICD-10-CM

## 2024-12-04 PROCEDURE — 97112 NEUROMUSCULAR REEDUCATION: CPT

## 2024-12-04 PROCEDURE — 97140 MANUAL THERAPY 1/> REGIONS: CPT

## 2024-12-04 PROCEDURE — 97110 THERAPEUTIC EXERCISES: CPT

## 2024-12-04 NOTE — PROGRESS NOTES
Daily Note     Today's date: 2024  Patient name: Dawna Apple  : 1980  MRN: [id number]  Referring provider: Essence Deluna MD  Dx:   Encounter Diagnosis     ICD-10-CM    1. Acute bilateral low back pain with right-sided sciatica  M54.41             Start Time: 1630  Stop Time: 1718  Total time in clinic (min): 48 minutes      Subjective: Patient notes same pain. Notes slight improvement in her pain throughout the day, especially her Monday during work. Some of the exercises are beginning to get easier such as the pelvic tilts, particularly APT.       Objective: See treatment diary below      Assessment: Patient tolerated treatment session fair today. Patient continues to have significant hip pain with hip PROM and most relief with joint distraction. Patient is unable to tolerate prone position due to hip pain. Reccommended to seek Ortho Surg for opinion and potentially imaging. Patient will continue to be appropriate for skilled outpatient physical therapy in order to address impairments. 1:1 with Nisha Finley, PT, DPT for entirety of treatment session.      Plan: Continue per plan of care.      Precautions: hx of GERD    POC expires Unit limit Auth Expiration date PT/OT + Visit Limit?   1/15/25 3  MC 24 BOMN PCY         Visit/Unit Tracking  AUTH Status:  Date       6 Visits Used 1 2 3       Remaining  5 4 3         Pertinent Findings:      Test / Measure  2024   FOTO (Predicted 64) 36   Lumbar AROM 10-25% for R SB and Extension + P!   Hip ROM Mild to Mod Limitation Bilaterally   L > R         Access Code: [email or web address]  URL: [email or web address]  Date: 2024  Prepared by: Nisha Finley    Exercises  - Supine Lower Trunk Rotation  - 1-2 x daily - 7 x weekly - 2 sets - 10 reps  - Supine Piriformis Stretch with Foot on Ground  - 1 x daily - 7 x weekly - 3-4 sets - 1 reps - 20 second hold  - Supine Posterior Pelvic Tilt  - 1 x daily - 7 x weekly - 2 sets - 10  "reps  - Bent Knee Fallouts  - 1 x daily - 7 x weekly - 2-3 sets - 10 reps  - Sidelying Hip Abduction  - 1 x daily - 7 x weekly - 2 sets - 10 reps  - Femoral Nerve Sliders    Manuals 11/20 11/27 12/4          Hip PROM  AR AR          Bilateral Hip Distraction  AR AR          Femoral Sliders   AR                       Neuro Re-Ed             HEP/Patient Education/PT Diagnosis AR 23' Updated  AR          LTR on pball  With hip iso to ball   20x ea With hip iso to ball   20x ea          Reverse Crunch (BKTC) with Hip Adduction Iso  20x 20x          Hookyling TA Pball Press  5\"x20 5\"x20          90/90 Hamstring Iso Pball to Wall  5\"x20 5\"x20                                                 Ther Ex             Treadmill Walking  6'           NuStep   8'  L6          Hooklying Unilateral Clamshells  #0  20x  ea           Hip Adduction Iso  Ball  5\"x30 Ball  5\"x20          Hip Hiking with Ball  np           SL Hip Abduction  3x10  eac 3x10          Side-Lying Hip Flexion to Extension                                                                 Ther Activity                                       Gait Training                                       Modalities                                            "

## 2024-12-12 ENCOUNTER — APPOINTMENT (OUTPATIENT)
Dept: PHYSICAL THERAPY | Facility: REHABILITATION | Age: 44
End: 2024-12-12
Payer: COMMERCIAL

## 2024-12-13 ENCOUNTER — OFFICE VISIT (OUTPATIENT)
Dept: OBGYN CLINIC | Facility: CLINIC | Age: 44
End: 2024-12-13
Payer: COMMERCIAL

## 2024-12-13 VITALS — WEIGHT: 212 LBS | HEIGHT: 69 IN | BODY MASS INDEX: 31.4 KG/M2

## 2024-12-13 DIAGNOSIS — M76.11 PSOAS TENDONITIS OF RIGHT SIDE: Primary | ICD-10-CM

## 2024-12-13 PROCEDURE — 99204 OFFICE O/P NEW MOD 45 MIN: CPT | Performed by: ORTHOPAEDIC SURGERY

## 2024-12-13 RX ORDER — METHYLPREDNISOLONE 4 MG/1
TABLET ORAL
Qty: 21 EACH | Refills: 0 | Status: SHIPPED | OUTPATIENT
Start: 2024-12-13

## 2024-12-13 NOTE — PROGRESS NOTES
Assessment:  1. Psoas tendonitis of right side  Ambulatory referral to Physical Therapy    Ambulatory referral to Spine & Pain Management        Patient Active Problem List   Diagnosis    Multiple lung nodules on CT    Mild intermittent asthma without complication    Wegener's granulomatosis without renal involvement (HCC)    Nasal dryness    Nasal vestibulitis    Annual physical exam    Stridor    Other headache syndrome    Gastroesophageal reflux disease    Acute bilateral low back pain    Herpes stomatitis    Herpes simplex infection of genitourinary system           Plan:  44 year old female with right psoas tendonitis  Diagnostics reviewed and physical exam performed.  Diagnosis, treatment options and associated risks were discussed with the patient including no treatment, nonsurgical treatment and potential for surgical intervention.  The patient was given the opportunity to ask questions regarding each.   Discussed that psoas tendon spasm is likely secondary to lumbar pathology  X-ray obtained today and reviewed with the patient demonstrating no acute osseous abnormalities  Continue Physical Therapy until discharged from their care, transition to home exercise program  Referral placed today for Spine and Pain evaluation and in the future, receive psoas tendon injection after addressing the lumbar pathology  Order placed today for Medrol Dose Miguel to be taken as prescribed for pain relief  May use ice or heat as needed for pain relief  May take NSAIDs/Tylenol as needed for pain control  Follow up as needed        Subjective:     Patient ID:  Dawna Apple 44 y.o. female    HPI  Dawna Apple is a 44 y.o. female who presents today for evaluation and treatment of right hip pain ongoing for approximately one month with no obvious mechanism of injury, trauma, or inciting event. She awoke one day with severe lumbar pain aggravated by prolonged sitting while driving. She complains of anterior thigh and groin pain  as well as pain with weight bearing, pain with laying flat on her stomach. She has taken over the counter medication, initiated PT with minimal relief so far. She has a history of Wegener's granulomatosis with chronic intermittent steroid use. She notes intermittent numbness and tingling in the same distribution of his pain.         The following portions of the patient's history were reviewed and updated as appropriate: allergies, current medications, past family history, past social history, past surgical history and problem list.        Social History     Socioeconomic History    Marital status: /Civil Union     Spouse name: Not on file    Number of children: 1    Years of education: Not on file    Highest education level: Not on file   Occupational History    Occupation: Investigator   Tobacco Use    Smoking status: Never    Smokeless tobacco: Never   Vaping Use    Vaping status: Never Used   Substance and Sexual Activity    Alcohol use: Yes     Comment: social    Drug use: Never    Sexual activity: Yes     Partners: Male     Birth control/protection: None   Other Topics Concern    Not on file   Social History Narrative    · Most recent tobacco use screenin2019      · Do you currently or have you served in the [employer]:   No      · Occupation:   INVESTIGATOR      · General stress level:   High      · Exercise level:   Moderate      · Diet:   Regular       · Sexual orientation:   Heterosexual      · Live alone or with others:   with others      · Caffeine intake:   Occasional       · Seat belts used routinely:   Yes      · Do you feel safe at home:   Yes     Social Drivers of Health     Financial Resource Strain: Not on file   Food Insecurity: Not on file   Transportation Needs: Not on file   Physical Activity: Not on file   Stress: Not on file   Social Connections: Not on file   Intimate Partner Violence: Not on file   Housing Stability: Not on file     Past Medical History:   Diagnosis  Date    Asthma     Disease of thyroid gland     Multiple lung nodules on CT 8/16/2019    Shortness of breath 8/16/2019    Thyroid cyst     Wegener's granulomatosis     Wegener's granulomatosis      Past Surgical History:   Procedure Laterality Date    MOUTH SURGERY      US GUIDED FINE NEEDLE ASPIRATION (ALL INC)  4/10/2014    US GUIDED THYROID BIOPSY  2014     Allergies   Allergen Reactions    Ibuprofen Hives    Other Eye Swelling     seasonal     Current Outpatient Medications on File Prior to Visit   Medication Sig Dispense Refill    albuterol (PROVENTIL HFA,VENTOLIN HFA) 90 mcg/act inhaler Inhale 2 puffs every 4 (four) hours as needed for wheezing 1 Inhaler 3    Ascorbic Acid (vitamin C) 1000 MG tablet Take 1,000 mg by mouth daily      b complex vitamins capsule Take 1 capsule by mouth daily      Biotin 10 MG CAPS Take by mouth      fluticasone (Flovent HFA) 110 MCG/ACT inhaler Inhale 2 puffs 2 (two) times a day Rinse mouth after use. 12 g 0    Fluticasone Propionate 93 MCG/ACT EXHU 1 actuation into each nostril 2 (two) times a day 16 mL 6    glucosamine-chondroitin 500-400 MG tablet Take 1 tablet by mouth 3 (three) times a day      methocarbamol (ROBAXIN) 500 mg tablet Take 1 tablet (500 mg total) by mouth 3 (three) times a day 90 tablet 0    Multiple Vitamins-Minerals (multivitamin with minerals) tablet Take 1 tablet by mouth daily      omeprazole (PriLOSEC) 40 MG capsule TAKE 1 CAPSULE BY MOUTH  DAILY 1/2 HOUR BEFORE  BREAKFAST 90 capsule 3    traMADol (Ultram) 50 mg tablet Take 1 tablet (50 mg total) by mouth every 6 (six) hours as needed for moderate pain 30 tablet 0    valACYclovir (VALTREX) 500 mg tablet Take 1 tablet (500 mg total) by mouth 3 (three) times a day for 7 days 90 tablet 3     No current facility-administered medications on file prior to visit.              Objective:    Review of Systems  Pertinent items are noted in HPI.  All other systems were reviewed and are negative.    Physical Exam  Ht  "5' 9\" (1.753 m)   Wt 96.2 kg (212 lb)   BMI 31.31 kg/m²   Cons: Appears well.  No apparent distress.  Psych: Alert. Oriented x3.  Mood and affect normal.  Eyes: PERRLA, EOMI  Resp: Normal effort.  No audible wheezing or stridor.  CV: Palpable pulse.  No discernable arrhythmia.  No LE edema.  Lymph:  No palpable cervical, axillary, or inguinal lymphadenopathy.  Skin: Warm.  No palpable masses.  No visible lesions.  Neuro: Normal muscle tone.  Normal and symmetric DTR's.    Ortho Exam  Right Hip Exam  Alignment / Posture:  Normal lumbar alignment. Normal resting hip posture.  Inspection:  No swelling. No erythema. No ecchymosis. No muscle atrophy. No deformity.  Palpation:   psoas tenderness.  ROM:   limited hip extension due to pain  IR 40 ER 30.   Strength:  5/5 hip flexors and abductors.  Stability:  (-) Logroll.  Tests:  (+) Stinchfield. (+) Straight leg raise.  Neurovascular:  Sensation intact in DP/SP/Redman/Sa/T nerve distributions. 2+ DP & PT pulses. Brisk capillary refill in all toes.  Gait:  Antalgic.      Procedures  Procedures  No Procedures performed today    Diagnostics, reviewed and taken today if performed as documented:  I have personally reviewed pertinent films in PACS and my interpretation is right hip x-ray demonstrates no acute osseous abnormalities.        Scribe Attestation      I,:  Marissa Romero am acting as a scribe while in the presence of the attending physician.:       I,:  Manuel Crews, DO personally performed the services described in this documentation    as scribed in my presence.:             Portions of the record may have been created with voice recognition software.  Occasional wrong word or \"sound a like\" substitutions may have occurred due to the inherent limitations of voice recognition software.  Read the chart carefully and recognize, using context, where substitutions have occurred.  "

## 2024-12-30 ENCOUNTER — OFFICE VISIT (OUTPATIENT)
Dept: URGENT CARE | Facility: MEDICAL CENTER | Age: 44
End: 2024-12-30
Payer: COMMERCIAL

## 2024-12-30 ENCOUNTER — TELEPHONE (OUTPATIENT)
Age: 44
End: 2024-12-30

## 2024-12-30 VITALS
TEMPERATURE: 100.4 F | HEART RATE: 112 BPM | SYSTOLIC BLOOD PRESSURE: 134 MMHG | OXYGEN SATURATION: 97 % | DIASTOLIC BLOOD PRESSURE: 82 MMHG | RESPIRATION RATE: 26 BRPM

## 2024-12-30 DIAGNOSIS — R05.1 ACUTE COUGH: Primary | ICD-10-CM

## 2024-12-30 DIAGNOSIS — Z20.828 EXPOSURE TO THE FLU: ICD-10-CM

## 2024-12-30 PROCEDURE — S9083 URGENT CARE CENTER GLOBAL: HCPCS | Performed by: FAMILY MEDICINE

## 2024-12-30 PROCEDURE — 87636 SARSCOV2 & INF A&B AMP PRB: CPT | Performed by: FAMILY MEDICINE

## 2024-12-30 PROCEDURE — G0382 LEV 3 HOSP TYPE B ED VISIT: HCPCS | Performed by: FAMILY MEDICINE

## 2024-12-30 RX ORDER — OSELTAMIVIR PHOSPHATE 75 MG/1
75 CAPSULE ORAL EVERY 12 HOURS SCHEDULED
Qty: 10 CAPSULE | Refills: 0 | Status: SHIPPED | OUTPATIENT
Start: 2024-12-30 | End: 2025-01-04

## 2024-12-30 RX ORDER — AZITHROMYCIN 500 MG/1
TABLET, FILM COATED ORAL
COMMUNITY
Start: 2024-12-19

## 2024-12-30 NOTE — PROGRESS NOTES
[facility]        NAME: Dawna Apple is a 44 y.o. female  : 1980    MRN: [id number]  DATE: 2024  TIME: 3:45 PM    Assessment and Plan   Acute cough [R05.1]  1. Acute cough  CANCELED: XR chest pa and lateral    CANCELED: XR chest pa and lateral    CANCELED: XR chest pa and lateral      2. Exposure to the flu  Covid/Flu- Office Collect Normal    oseltamivir (TAMIFLU) 75 mg capsule        Start on tamiflu and await results.  Discussed rsks/benefits  Discussed viral infection course and ED/return precautions.      Patient Instructions       Follow up with PCP in 3-5 days.  Proceed to  ER if symptoms worsen.    If tests have been performed at [facility], our office will contact you with results if changes need to be made to the care plan discussed with you at the visit.  [facility]    Chief Complaint     Chief Complaint   Patient presents with   • Flu Symptoms     Fever, runny nose, sneezing, cough, sinus pressure, chills, nausea, body aches, decreased appetite; symptoms started 3 days ago; concerned due to autoimmune condition      had flu over [place]co    • Shortness of Breath   • Wheezing         History of Present Illness       Flu Symptoms  The current episode started yesterday. The problem occurs constantly. The problem has been gradually improving since onset. The pain is severe. Nothing aggravates the symptoms. Associated symptoms include congestion, ear pain, headaches, rhinorrhea, a sore throat, a URI, fatigue, a fever, coughing, shortness of breath and wheezing. Pertinent negatives include no decreased vision, double vision, ear discharge, eye discharge, eye itching, eye pain, eye redness, hearing loss, mouth sores, photophobia, stridor, swollen glands, abdominal pain, constipation, diarrhea, nausea or vomiting. Past treatments include one or more OTC medications and rest. The treatment provided mild relief. The fever has  been present for 1 to 2 days. The maximum temperature noted was 100.4 to 100.9 F.   Shortness of Breath  Associated symptoms include coughing, fatigue, rhinorrhea, a sore throat and wheezing. Pertinent negatives include no stridor.   Wheezing  Associated symptoms include coughing, fatigue, rhinorrhea, a sore throat and wheezing. Pertinent negatives include no stridor.       Review of Systems   Review of Systems   Constitutional:  Positive for fatigue and fever.   HENT:  Positive for congestion, ear pain, rhinorrhea and sore throat. Negative for ear discharge, hearing loss and mouth sores.    Eyes:  Negative for double vision, photophobia, pain, discharge, redness and itching.   Respiratory:  Positive for cough, shortness of breath and wheezing. Negative for stridor.    Gastrointestinal:  Negative for abdominal pain, constipation, diarrhea, nausea and vomiting.   Neurological:  Positive for headaches.         Current Medications       Current Outpatient Medications:   •  azithromycin (ZITHROMAX) 500 MG tablet, , Disp: , Rfl:   •  oseltamivir (TAMIFLU) 75 mg capsule, Take 1 capsule (75 mg total) by mouth every 12 (twelve) hours for 5 days, Disp: 10 capsule, Rfl: 0  •  albuterol (PROVENTIL HFA,VENTOLIN HFA) 90 mcg/act inhaler, Inhale 2 puffs every 4 (four) hours as needed for wheezing, Disp: 1 Inhaler, Rfl: 3  •  Ascorbic Acid (vitamin C) 1000 MG tablet, Take 1,000 mg by mouth daily, Disp: , Rfl:   •  b complex vitamins capsule, Take 1 capsule by mouth daily, Disp: , Rfl:   •  Biotin 10 MG CAPS, Take by mouth, Disp: , Rfl:   •  fluticasone (Flovent HFA) 110 MCG/ACT inhaler, Inhale 2 puffs 2 (two) times a day Rinse mouth after use., Disp: 12 g, Rfl: 0  •  Fluticasone Propionate 93 MCG/ACT EXHU, 1 actuation into each nostril 2 (two) times a day, Disp: 16 mL, Rfl: 6  •  glucosamine-chondroitin 500-400 MG tablet, Take 1 tablet by mouth 3 (three) times a day, Disp: , Rfl:   •  methocarbamol (ROBAXIN) 500 mg tablet, Take 1  tablet (500 mg total) by mouth 3 (three) times a day, Disp: 90 tablet, Rfl: 0  •  methylPREDNISolone 4 MG tablet therapy pack, Use as directed on package, Disp: 21 each, Rfl: 0  •  Multiple Vitamins-Minerals (multivitamin with minerals) tablet, Take 1 tablet by mouth daily, Disp: , Rfl:   •  omeprazole (PriLOSEC) 40 MG capsule, TAKE 1 CAPSULE BY MOUTH  DAILY 1/2 HOUR BEFORE  BREAKFAST, Disp: 90 capsule, Rfl: 3  •  traMADol (Ultram) 50 mg tablet, Take 1 tablet (50 mg total) by mouth every 6 (six) hours as needed for moderate pain, Disp: 30 tablet, Rfl: 0  •  valACYclovir (VALTREX) 500 mg tablet, Take 1 tablet (500 mg total) by mouth 3 (three) times a day for 7 days, Disp: 90 tablet, Rfl: 3    Current Allergies     Allergies as of 12/30/2024 - Reviewed 12/30/2024   Allergen Reaction Noted   • Ibuprofen Hives 08/14/2019   • Other Eye Swelling 08/16/2019            The following portions of the patient's history were reviewed and updated as appropriate: allergies, current medications, past family history, past medical history, past social history, past surgical history and problem list.     Past Medical History:   Diagnosis Date   • Asthma    • Disease of thyroid gland    • Multiple lung nodules on CT 8/16/2019   • Shortness of breath 8/16/2019   • Thyroid cyst    • Wegener's granulomatosis    • Wegener's granulomatosis        Past Surgical History:   Procedure Laterality Date   • MOUTH SURGERY     • US GUIDED FINE NEEDLE ASPIRATION (ALL INC)  4/10/2014   • US GUIDED THYROID BIOPSY  2014       Family History   Problem Relation Age of Onset   • Hypertension Mother    • No Known Problems Father    • No Known Problems Brother    • Stroke Maternal Grandmother    • Dementia Maternal Grandfather    • Diabetes Paternal Grandmother    • Dementia Paternal Grandfather    • No Known Problems Half-Sister    • No Known Problems Half-Sister    • No Known Problems Half-Sister    • No Known Problems Maternal Aunt    • No Known Problems  Maternal Aunt    • No Known Problems Maternal Aunt    • No Known Problems Maternal Aunt    • No Known Problems Maternal Aunt    • No Known Problems Maternal Aunt    • No Known Problems Paternal Aunt    • No Known Problems Paternal Aunt    • No Known Problems Paternal Aunt    • Colon cancer Maternal Uncle 62   • Breast cancer Cousin 29   • BRCA 1/2 Cousin         unknown result         Medications have been verified.        Objective   /82   Pulse (!) 112   Temp 100.4 °F (38 °C)   Resp (!) 26   SpO2 97%   No LMP recorded.       Physical Exam     Physical Exam  Vitals reviewed.   Constitutional:       General: She is not in acute distress.     Appearance: Normal appearance. She is not ill-appearing, toxic-appearing or diaphoretic.   HENT:      Head: Normocephalic and atraumatic.      Right Ear: Tympanic membrane normal.      Left Ear: Tympanic membrane normal.      Nose: Congestion and rhinorrhea present.      Mouth/Throat:      Mouth: Mucous membranes are moist.      Pharynx: Posterior oropharyngeal erythema present. No oropharyngeal exudate.   Eyes:      Pupils: Pupils are equal, round, and reactive to light.   Cardiovascular:      Rate and Rhythm: Normal rate and regular rhythm.      Heart sounds: No murmur heard.  Pulmonary:      Effort: Pulmonary effort is normal. No respiratory distress.      Breath sounds: Normal breath sounds.   Abdominal:      General: Abdomen is flat.      Palpations: Abdomen is soft.      Tenderness: There is no abdominal tenderness.   Musculoskeletal:         General: Normal range of motion.      Cervical back: Normal range of motion. No rigidity or tenderness.   Lymphadenopathy:      Cervical: No cervical adenopathy.   Skin:     General: Skin is warm and dry.      Capillary Refill: Capillary refill takes less than 2 seconds.   Neurological:      General: No focal deficit present.      Mental Status: She is alert and oriented to person, place, and time. Mental status is at  baseline.   Psychiatric:         Mood and Affect: Mood normal.         Behavior: Behavior normal.         Thought Content: Thought content normal.

## 2024-12-30 NOTE — PROGRESS NOTES
[facility]        NAME: Dawna Apple is a 44 y.o. female  : 1980    MRN: [id number]  DATE: 2024  TIME: 2:31 PM    Assessment and Plan   Acute cough [R05.1]  1. Acute cough  XR chest pa and lateral      2. Exposure to the flu  Covid/Flu- Office Collect Normal            Patient Instructions       Follow up with PCP in 3-5 days.  Proceed to  ER if symptoms worsen.    Chief Complaint     Chief Complaint   Patient presents with    Flu Symptoms     Fever, runny nose, sneezing, cough, sinus pressure, chills, nausea, body aches, decreased appetite; symptoms started 3 days ago; concerned due to autoimmune condition      had flu over [place]     Shortness of Breath    Wheezing         History of Present Illness       Flu Symptoms  Associated symptoms include shortness of breath and wheezing.   Shortness of Breath  Associated symptoms include wheezing.   Wheezing  Associated symptoms include wheezing.       Review of Systems   Review of Systems   Respiratory:  Positive for shortness of breath and wheezing.          Current Medications       Current Outpatient Medications:     azithromycin (ZITHROMAX) 500 MG tablet, , Disp: , Rfl:     albuterol (PROVENTIL HFA,VENTOLIN HFA) 90 mcg/act inhaler, Inhale 2 puffs every 4 (four) hours as needed for wheezing, Disp: 1 Inhaler, Rfl: 3    Ascorbic Acid (vitamin C) 1000 MG tablet, Take 1,000 mg by mouth daily, Disp: , Rfl:     b complex vitamins capsule, Take 1 capsule by mouth daily, Disp: , Rfl:     Biotin 10 MG CAPS, Take by mouth, Disp: , Rfl:     fluticasone (Flovent HFA) 110 MCG/ACT inhaler, Inhale 2 puffs 2 (two) times a day Rinse mouth after use., Disp: 12 g, Rfl: 0    Fluticasone Propionate 93 MCG/ACT EXHU, 1 actuation into each nostril 2 (two) times a day, Disp: 16 mL, Rfl: 6    glucosamine-chondroitin 500-400 MG tablet, Take 1 tablet by mouth 3 (three) times a day, Disp: , Rfl:     methocarbamol (ROBAXIN) 500 mg tablet, Take 1 tablet  (500 mg total) by mouth 3 (three) times a day, Disp: 90 tablet, Rfl: 0    methylPREDNISolone 4 MG tablet therapy pack, Use as directed on package, Disp: 21 each, Rfl: 0    Multiple Vitamins-Minerals (multivitamin with minerals) tablet, Take 1 tablet by mouth daily, Disp: , Rfl:     omeprazole (PriLOSEC) 40 MG capsule, TAKE 1 CAPSULE BY MOUTH  DAILY 1/2 HOUR BEFORE  BREAKFAST, Disp: 90 capsule, Rfl: 3    traMADol (Ultram) 50 mg tablet, Take 1 tablet (50 mg total) by mouth every 6 (six) hours as needed for moderate pain, Disp: 30 tablet, Rfl: 0    valACYclovir (VALTREX) 500 mg tablet, Take 1 tablet (500 mg total) by mouth 3 (three) times a day for 7 days, Disp: 90 tablet, Rfl: 3    Current Allergies     Allergies as of 12/30/2024 - Reviewed 12/30/2024   Allergen Reaction Noted    Ibuprofen Hives 08/14/2019    Other Eye Swelling 08/16/2019            The following portions of the patient's history were reviewed and updated as appropriate: allergies, current medications, past family history, past medical history, past social history, past surgical history and problem list.     Past Medical History:   Diagnosis Date    Asthma     Disease of thyroid gland     Multiple lung nodules on CT 8/16/2019    Shortness of breath 8/16/2019    Thyroid cyst     Wegener's granulomatosis     Wegener's granulomatosis        Past Surgical History:   Procedure Laterality Date    MOUTH SURGERY      US GUIDED FINE NEEDLE ASPIRATION (ALL INC)  4/10/2014    US GUIDED THYROID BIOPSY  2014       Family History   Problem Relation Age of Onset    Hypertension Mother     No Known Problems Father     No Known Problems Brother     Stroke Maternal Grandmother     Dementia Maternal Grandfather     Diabetes Paternal Grandmother     Dementia Paternal Grandfather     No Known Problems Half-Sister     No Known Problems Half-Sister     No Known Problems Half-Sister     No Known Problems Maternal Aunt     No Known Problems Maternal Aunt     No Known Problems  Maternal Aunt     No Known Problems Maternal Aunt     No Known Problems Maternal Aunt     No Known Problems Maternal Aunt     No Known Problems Paternal Aunt     No Known Problems Paternal Aunt     No Known Problems Paternal Aunt     Colon cancer Maternal Uncle 62    Breast cancer Cousin 29    BRCA 1/2 Cousin         unknown result         Medications have been verified.        Objective   /82   Pulse (!) 112   Temp 98.9 °F (37.2 °C)   Resp (!) 26   SpO2 97%        Physical Exam     Physical Exam

## 2024-12-30 NOTE — TELEPHONE ENCOUNTER
Pt called asking for same day appt to get tested for poss Flu and Covid. No same day appt. Reports flu like symptoms. Cough, congestion, sneezing headaches, body aches. Recommended [facility]. Pt agreed to go.

## 2024-12-31 LAB
FLUAV RNA RESP QL NAA+PROBE: POSITIVE
FLUBV RNA RESP QL NAA+PROBE: NEGATIVE
SARS-COV-2 RNA RESP QL NAA+PROBE: NEGATIVE

## 2025-02-07 ENCOUNTER — CONSULT (OUTPATIENT)
Dept: PAIN MEDICINE | Facility: CLINIC | Age: 45
End: 2025-02-07
Payer: COMMERCIAL

## 2025-02-07 VITALS
BODY MASS INDEX: 30.81 KG/M2 | HEIGHT: 69 IN | HEART RATE: 104 BPM | SYSTOLIC BLOOD PRESSURE: 126 MMHG | DIASTOLIC BLOOD PRESSURE: 71 MMHG | WEIGHT: 208 LBS

## 2025-02-07 DIAGNOSIS — G57.11 MERALGIA PARAESTHETICA, RIGHT: ICD-10-CM

## 2025-02-07 DIAGNOSIS — M31.30 WEGENER'S GRANULOMATOSIS WITHOUT RENAL INVOLVEMENT (HCC): ICD-10-CM

## 2025-02-07 DIAGNOSIS — M54.16 LUMBAR RADICULOPATHY: Primary | ICD-10-CM

## 2025-02-07 DIAGNOSIS — M70.71 ILIOPSOAS BURSITIS OF RIGHT HIP: ICD-10-CM

## 2025-02-07 DIAGNOSIS — M76.11 PSOAS TENDONITIS OF RIGHT SIDE: ICD-10-CM

## 2025-02-07 PROCEDURE — 99204 OFFICE O/P NEW MOD 45 MIN: CPT | Performed by: PHYSICAL MEDICINE & REHABILITATION

## 2025-02-07 NOTE — PROGRESS NOTES
Assessment  1. Lumbar radiculopathy    2. Psoas tendonitis of right side    3. Meralgia paraesthetica, right    4. Iliopsoas bursitis of right hip    5. Wegener's granulomatosis without renal involvement (HCC)        Plan  Ms. Apple is a pleasant 44-year-old female significant past medical history of Wegener's granulomatosis presents to [facility] as referral from Dr. Crews regarding isolated low back, right hip and right lower extremity pain.  During today's evaluation she is demonstrating pain that is likely multifactorial in nature with the majority of her symptoms do appear to be generating from the hip as well as questionable high-level lumbar radicular issues.  Her symptoms do seem to traverse along the L1, L2 and L3 dermatomal distribution.  She reports recent weight gain of over 20 to 25 pounds in light of her job which requires an hour and a half there and back driving.  At this time she is demonstrating clinical evidence of suspected meralgia paresthetica of the right, iliopsoas bursitis and questionable lumbar pathology.  At this time we will order MRI lumbar spine without contrast.  She has completed greater than 6 weeks of physical therapy in the last 6 months and continues with home exercises with minimal improvements in her pain.  Will still plan for a right sided lateral femoral cutaneous nerve block under ultrasound guidance.  Will then plan for a right sided iliopsoas bursa steroid injection under ultrasound guidance.  We will space out these procedures greater than 14 days apart.  All questions answered, patient is agreeable with plan.    Complete risks and benefits including bleeding, infection, tissue reaction, nerve injury and allergic reaction were discussed. The approach was demonstrated using models and literature was provided. Verbal and written consent was obtained.    My impressions and treatment recommendations were discussed in detail with the patient who  "verbalized understanding and had no further questions.  Discharge instructions were provided. I personally saw and examined the patient and I agree with the above discussed plan of care.    Orders Placed This Encounter   Procedures    MRI lumbar spine wo contrast     Standing Status:   Future     Expected Date:   2/7/2025     Expiration Date:   2/7/2029     Scheduling Instructions:      There is no preparation for this test. Please leave your jewelry and valuables at home, wedding rings are the exception. All patients will be required to change into a hospital gown and pants.  Street clothes are not permitted in the MRI.  Magnetic nail polish must be removed prior to arrival for your test. Please bring your insurance cards, a form of photo ID and a list of your medications with you. Arrive 15 minutes prior to your appointment time in order to register. Please bring any prior CT or MRI studies of this area that were not performed at a [facility].            To schedule this appointment, please contact Central Scheduling at [phone].            Prior to your appointment, please make sure you complete the MRI Screening Form when you e-Check in for your appointment. This will be available starting 7 days before your appointment in [employer]. You may receive an e-mail with an activation code if you do not have a [employer] account. If you do not have access to a device, we will complete your screening at your appointment.     Reason for Exam:   lumbar radiculopathy     Is the patient pregnant?:   No     For OP exams needed \"URGENT\", choose the appropriate timeframe below and call Central Scheduling at [phone]. No need to speak with a Radiologist.:   Not URGENT     What is the patient's sedation requirement?:   No Sedation     Does the patient need medication for Claustrophobia? If yes, order medication at this point.:   No     Does the patient wear a life vest, have an implanted cardiac device, a " stimulation device, a sleep apnea stimulator, or a breast tissue expansion device?:   No     Release to patient through [employer]:   Immediate     No orders of the defined types were placed in this encounter.      History of Present Illness    Dawna Apple is a 44 y.o. female presents to [facility] for initial evaluation regarding greater than 3 months duration of isolated right sided hip, groin and lower extremity pain.  She is a .  States symptoms are moderate to severe and denies any significant inciting event or recent trauma.  Rates her pain a 5-7 out of 10 and greatly impacting her quality of life and actives of daily living.  States symptoms are nearly constant 60 to 95% of the time that is present throughout the day and night.  Describes symptoms as shooting, numbness, sharp, throbbing pain.  Also reports lower extremity weakness but denies any falls.  Does not use any durable medical equipment fibrillation.  Symptoms are worse with lying down, standing, bending, sitting, walking.  Also reports moderate relief with physical therapy, heat and TENS unit.  Denies smoking or marijuana use.  Admits to social alcohol use.  Currently taking Tylenol and Motrin with some relief.  Presents today for initial evaluation.    I have personally reviewed and/or updated the patient's past medical history, past surgical history, family history, social history, current medications, allergies, and vital signs today.     Review of Systems   Constitutional:  Negative for fever and unexpected weight change.   HENT:  Negative for trouble swallowing.    Eyes:  Negative for visual disturbance.   Respiratory:  Negative for shortness of breath and wheezing.    Cardiovascular:  Negative for chest pain and palpitations.   Gastrointestinal:  Negative for constipation, diarrhea, nausea and vomiting.   Endocrine: Negative for cold intolerance, heat intolerance and polydipsia.   Genitourinary:   Negative for difficulty urinating and frequency.   Musculoskeletal:  Positive for back pain. Negative for arthralgias, gait problem, joint swelling and myalgias.        Right hip pain  Right leg thigh pain to right knee   Skin:  Negative for rash.   Neurological:  Negative for dizziness, seizures, syncope, weakness and headaches.   Hematological:  Does not bruise/bleed easily.   Psychiatric/Behavioral:  Negative for dysphoric mood.    All other systems reviewed and are negative.      Patient Active Problem List   Diagnosis    Multiple lung nodules on CT    Mild intermittent asthma without complication    Wegener's granulomatosis without renal involvement (HCC)    Nasal dryness    Nasal vestibulitis    Annual physical exam    Stridor    Other headache syndrome    Gastroesophageal reflux disease    Acute bilateral low back pain    Herpes stomatitis    Herpes simplex infection of genitourinary system    Psoas tendonitis of right side       Past Medical History:   Diagnosis Date    Asthma     Disease of thyroid gland     Multiple lung nodules on CT 8/16/2019    Shortness of breath 8/16/2019    Thyroid cyst     Wegener's granulomatosis     Wegener's granulomatosis        Past Surgical History:   Procedure Laterality Date    MOUTH SURGERY      US GUIDED FINE NEEDLE ASPIRATION (ALL INC)  4/10/2014    US GUIDED THYROID BIOPSY  2014       Family History   Problem Relation Age of Onset    Hypertension Mother     No Known Problems Father     No Known Problems Brother     Stroke Maternal Grandmother     Dementia Maternal Grandfather     Diabetes Paternal Grandmother     Dementia Paternal Grandfather     No Known Problems Half-Sister     No Known Problems Half-Sister     No Known Problems Half-Sister     No Known Problems Maternal Aunt     No Known Problems Maternal Aunt     No Known Problems Maternal Aunt     No Known Problems Maternal Aunt     No Known Problems Maternal Aunt     No Known Problems Maternal Aunt     No Known  Problems Paternal Aunt     No Known Problems Paternal Aunt     No Known Problems Paternal Aunt     Colon cancer Maternal Uncle 62    Breast cancer Cousin 29    BRCA 1/2 Cousin         unknown result       Social History     Occupational History    Occupation: Investigator   Tobacco Use    Smoking status: Never    Smokeless tobacco: Never   Vaping Use    Vaping status: Never Used   Substance and Sexual Activity    Alcohol use: Yes     Comment: social    Drug use: Never    Sexual activity: Yes     Partners: Male     Birth control/protection: None       Current Outpatient Medications on File Prior to Visit   Medication Sig    albuterol (PROVENTIL HFA,VENTOLIN HFA) 90 mcg/act inhaler Inhale 2 puffs every 4 (four) hours as needed for wheezing    Ascorbic Acid (vitamin C) 1000 MG tablet Take 1,000 mg by mouth daily    b complex vitamins capsule Take 1 capsule by mouth daily    Biotin 10 MG CAPS Take by mouth    fluticasone (Flovent HFA) 110 MCG/ACT inhaler Inhale 2 puffs 2 (two) times a day Rinse mouth after use.    Fluticasone Propionate 93 MCG/ACT EXHU 1 actuation into each nostril 2 (two) times a day    glucosamine-chondroitin 500-400 MG tablet Take 1 tablet by mouth 3 (three) times a day    Multiple Vitamins-Minerals (multivitamin with minerals) tablet Take 1 tablet by mouth daily    omeprazole (PriLOSEC) 40 MG capsule TAKE 1 CAPSULE BY MOUTH  DAILY 1/2 HOUR BEFORE  BREAKFAST    azithromycin (ZITHROMAX) 500 MG tablet     methocarbamol (ROBAXIN) 500 mg tablet Take 1 tablet (500 mg total) by mouth 3 (three) times a day (Patient not taking: Reported on 2/7/2025)    methylPREDNISolone 4 MG tablet therapy pack Use as directed on package (Patient not taking: Reported on 2/7/2025)    traMADol (Ultram) 50 mg tablet Take 1 tablet (50 mg total) by mouth every 6 (six) hours as needed for moderate pain (Patient not taking: Reported on 2/7/2025)    valACYclovir (VALTREX) 500 mg tablet Take 1 tablet (500 mg total) by mouth 3  "(three) times a day for 7 days     No current facility-administered medications on file prior to visit.       Allergies   Allergen Reactions    Ibuprofen Hives    Other Eye Swelling     seasonal       Physical Exam    /71 (BP Location: Right arm, Patient Position: Sitting, Cuff Size: Standard)   Pulse 104   Ht 5' 9\" (1.753 m)   Wt 94.3 kg (208 lb)   BMI 30.72 kg/m²     Constitutional: normal, well developed, well nourished, alert, in no distress and non-toxic and no overt pain behavior.  Eyes: anicteric  HEENT: grossly intact  Neck: supple, symmetric, trachea midline and no masses   Pulmonary:even and unlabored  Cardiovascular:No edema or pitting edema present  Skin:Normal without rashes or lesions and well hydrated  Psychiatric:Mood and affect appropriate  Neurologic:Cranial Nerves II-XII grossly intact  Musculoskeletal:normal gait, tenderness to palpation right lateral hip and groin, positive Tinel's with pain into the anterolateral thigh, negative straight leg raise bilaterally, MMT 5 out of 5 bilateral lower extremities, sensation grossly tact to light touch, DTRs within normal limits    Imaging  "

## 2025-02-13 ENCOUNTER — HOSPITAL ENCOUNTER (OUTPATIENT)
Dept: CT IMAGING | Facility: HOSPITAL | Age: 45
Discharge: HOME | End: 2025-02-13
Payer: COMMERCIAL

## 2025-02-13 DIAGNOSIS — R05.2 SUBACUTE COUGH: ICD-10-CM

## 2025-02-13 PROCEDURE — 71250 CT THORAX DX C-: CPT

## 2025-02-20 ENCOUNTER — TELEPHONE (OUTPATIENT)
Dept: PAIN MEDICINE | Facility: CLINIC | Age: 45
End: 2025-02-20

## 2025-02-20 NOTE — TELEPHONE ENCOUNTER
Patient has completed 3 PT visits as of 12/4/24.    Option for Peer to peer is available per MRI auth dept.  Please advise next steps    [facility]  The prior authorization request for this study has not been approved. You can schedule a peer to peer by calling [employer] [phone]  Case#  [id number] with the deadline date of Scheduled for 2/26. The insurance determined the following:    [ ] Does not meet Medical Necessity  [ ] No prior imaging  [x ] PT or conservative treatment incomplete  [ ] Missing Labs  [ ] Frequency    Imaging requires six weeks of provider directed treatment to be completed. Supported treatments  include (but are not limited to) drugs for swelling or pain, an in office workout (physical  therapy), and/or oral or injected steroids. This must have been completed in the past three  months without improved symptoms. Contact (via office visit, phone, email, or messaging) must  occur after the treatment is completed. This has not been met because:  The notes sent to us do not show you have completed a full six weeks of this type of treatment.  Symptoms must be the same or worse after treatment to support imaging.  The notes sent to us do not show any contact with your provider after you completed your  treatment. This contact is needed to plan your future care.    If you choose not to complete a peer to peer then please reply to this message to let us know. Please notify your patient and contact central scheduling by calling [phone] to cancel the appointment and cancel the order in Epic.

## 2025-02-21 NOTE — TELEPHONE ENCOUNTER
S/w pt and advised of the same, nurse advised pt to cancel MRI, complete PT, call and reschedule once 6 wks of PT is completed. Pt verbalized understanding  and appreciative of call.

## 2025-02-21 NOTE — TELEPHONE ENCOUNTER
Unfortunately insurance is monitoring number of visits and she needs to complete 6 weeks of physical therapy in the last 6 months for us to obtain imaging  Please advise  Thank you

## 2025-02-27 ENCOUNTER — PROCEDURE VISIT (OUTPATIENT)
Dept: PAIN MEDICINE | Facility: CLINIC | Age: 45
End: 2025-02-27
Payer: COMMERCIAL

## 2025-02-27 VITALS
SYSTOLIC BLOOD PRESSURE: 142 MMHG | RESPIRATION RATE: 14 BRPM | DIASTOLIC BLOOD PRESSURE: 80 MMHG | BODY MASS INDEX: 30.81 KG/M2 | HEART RATE: 109 BPM | WEIGHT: 208 LBS | HEIGHT: 69 IN

## 2025-02-27 DIAGNOSIS — G57.11 MERALGIA PARAESTHETICA, RIGHT: Primary | ICD-10-CM

## 2025-02-27 PROCEDURE — 76942 ECHO GUIDE FOR BIOPSY: CPT | Performed by: PHYSICAL MEDICINE & REHABILITATION

## 2025-02-27 PROCEDURE — 64450 NJX AA&/STRD OTHER PN/BRANCH: CPT | Performed by: PHYSICAL MEDICINE & REHABILITATION

## 2025-02-27 RX ORDER — LIDOCAINE HYDROCHLORIDE 10 MG/ML
5 INJECTION, SOLUTION INFILTRATION; PERINEURAL ONCE
Status: COMPLETED | OUTPATIENT
Start: 2025-02-27 | End: 2025-02-27

## 2025-02-27 RX ORDER — METHYLPREDNISOLONE ACETATE 40 MG/ML
40 INJECTION, SUSPENSION INTRA-ARTICULAR; INTRALESIONAL; INTRAMUSCULAR; SOFT TISSUE ONCE
Status: COMPLETED | OUTPATIENT
Start: 2025-02-27 | End: 2025-02-27

## 2025-02-27 RX ORDER — BUPIVACAINE HYDROCHLORIDE 2.5 MG/ML
5 INJECTION, SOLUTION EPIDURAL; INFILTRATION; INTRACAUDAL ONCE
Status: COMPLETED | OUTPATIENT
Start: 2025-02-27 | End: 2025-02-27

## 2025-02-27 RX ADMIN — METHYLPREDNISOLONE ACETATE 40 MG: 40 INJECTION, SUSPENSION INTRA-ARTICULAR; INTRALESIONAL; INTRAMUSCULAR; SOFT TISSUE at 11:24

## 2025-02-27 RX ADMIN — LIDOCAINE HYDROCHLORIDE 5 ML: 10 INJECTION, SOLUTION INFILTRATION; PERINEURAL at 11:23

## 2025-02-27 RX ADMIN — BUPIVACAINE HYDROCHLORIDE 5 ML: 2.5 INJECTION, SOLUTION EPIDURAL; INFILTRATION; INTRACAUDAL at 11:22

## 2025-02-27 NOTE — PROGRESS NOTES
Indication: Anterior lateral thigh pain  Preprocedure diagnosis: Meralgia paresthetica  Postprocedure diagnosis: Meralgia paresthetica  Procedure: Ultrasound-guided right- Lateral Femoral Cutaneous Nerve block  After discussing the risks, benefits, and alternatives to the procedure, the patient expressed understanding and wished to proceed. The patient was brought to the procedure suite and placed in the supine position. A procedural pause was conducted to verify: correct patient identity, procedure to be performed and as applicable, correct side and site, correct patient position, and availability of implants, special equipment or special requirements. A simple surgical tray was used. A simple surgical tray was used. Prior to the procedure, the thigh was examined with a 12 MHz linear transducer to visualize the lateral femoral cutaneous nerve and determine the optimal needle path. Following this, the area was prepared with a ChloraPrep scrub, then re-examined using the same transducer, a sterile ultrasound transducer cover, and sterile ultrasound transducer gel. Thereafter, using ultrasound guidance, a 2.5 inch 25-gauge needle was advanced into the thigh towards the lateral femoral cutaneous nerve. Lfter visualization of the tip near the nerve and negative aspiration for blood, a mixture of 40 mg of Depo-Medrol in 3 mL of 0.25% bupivacaine was injected into the thigh- around the LFCN- good nino-neural spread of injectate was noted. Following the injection, the needle was withdrawn. The patient tolerated the procedure well and there were no apparent complications. After an appropriate amount of observation, the patient was dismissed from the clinic in good condition under their own power.

## 2025-03-13 ENCOUNTER — TELEPHONE (OUTPATIENT)
Dept: PAIN MEDICINE | Facility: CLINIC | Age: 45
End: 2025-03-13

## 2025-03-14 ENCOUNTER — PROCEDURE VISIT (OUTPATIENT)
Dept: PAIN MEDICINE | Facility: CLINIC | Age: 45
End: 2025-03-14
Payer: COMMERCIAL

## 2025-03-14 VITALS
HEIGHT: 69 IN | HEART RATE: 91 BPM | RESPIRATION RATE: 14 BRPM | WEIGHT: 208 LBS | SYSTOLIC BLOOD PRESSURE: 120 MMHG | DIASTOLIC BLOOD PRESSURE: 79 MMHG | BODY MASS INDEX: 30.81 KG/M2

## 2025-03-14 DIAGNOSIS — M70.71 ILIOPSOAS BURSITIS OF RIGHT HIP: Primary | ICD-10-CM

## 2025-03-14 PROCEDURE — 20611 DRAIN/INJ JOINT/BURSA W/US: CPT | Performed by: PHYSICAL MEDICINE & REHABILITATION

## 2025-03-14 RX ORDER — METHYLPREDNISOLONE ACETATE 40 MG/ML
40 INJECTION, SUSPENSION INTRA-ARTICULAR; INTRALESIONAL; INTRAMUSCULAR; SOFT TISSUE ONCE
Status: COMPLETED | OUTPATIENT
Start: 2025-03-14 | End: 2025-03-14

## 2025-03-14 RX ORDER — LIDOCAINE HYDROCHLORIDE 10 MG/ML
2 INJECTION, SOLUTION INFILTRATION; PERINEURAL ONCE
Status: COMPLETED | OUTPATIENT
Start: 2025-03-14 | End: 2025-03-14

## 2025-03-14 RX ORDER — ROPIVACAINE HYDROCHLORIDE 2 MG/ML
2 INJECTION, SOLUTION EPIDURAL; INFILTRATION; PERINEURAL ONCE
Status: COMPLETED | OUTPATIENT
Start: 2025-03-14 | End: 2025-03-14

## 2025-03-14 RX ADMIN — ROPIVACAINE HYDROCHLORIDE 2 ML: 2 INJECTION, SOLUTION EPIDURAL; INFILTRATION; PERINEURAL at 13:18

## 2025-03-14 RX ADMIN — METHYLPREDNISOLONE ACETATE 40 MG: 40 INJECTION, SUSPENSION INTRA-ARTICULAR; INTRALESIONAL; INTRAMUSCULAR; SOFT TISSUE at 13:18

## 2025-03-14 RX ADMIN — LIDOCAINE HYDROCHLORIDE 2 ML: 10 INJECTION, SOLUTION INFILTRATION; PERINEURAL at 13:17

## 2025-03-14 NOTE — TELEPHONE ENCOUNTER
Spoke with patient, she was in today for an injection and said she talked with Dr. Purvis, she will wait for our next call and see if at that time she wants to come in for a follow up/reevaluation.  Patient knows she can call anytime and schedule.

## 2025-03-14 NOTE — PROGRESS NOTES
Indication: Hip pain  Preprocedure diagnosis: 1. Iliopsoas bursitis  2. Hip pain  Postprocedure diagnosis: 1. Iliopsoas bursitis  2. Hip pain  Procedure: Ultrasound-guided right iliopsoas bursa injection  After discussing the risks, benefits, and alternatives to the procedure, the patient expressed understanding and wished to proceed. The patient was brought to the procedure suite and placed in the supine position. A procedural pause was conducted to verify: correct patient identity, procedure to be performed and as applicable, correct side and site, correct patient position, and availability of implants, special equipment or special requirements. A simple surgical tray was used. Prior to the procedure, the hip joint was examined with a 3.75 MHz curvilinear transducer to visualize the hip joint, iliopsoas tendon and determine the optimal needle path. Following this, the groin was prepared with a ChloraPrep scrub, then re-examined using the same transducer, a sterile ultrasound transducer cover, and sterile ultrasound transducer gel. Thereafter, using continuous ultrasound guidance, a 5 inch 22-gauge needle was advanced into the iliopsoas bursa. After visualization of the tip in the target area and negative aspiration for blood, a mixture of 40 mg Depo-Medrol in 3 mL of 0.2% ropivacaine was injected into the iliopsoas bursa. Following the injection, the needle was withdrawn. The patient tolerated the procedure well and there were no apparent complications. After an appropriate amount of observation, the patient was dismissed from the clinic in good condition under their own power.

## 2025-03-28 ENCOUNTER — TELEPHONE (OUTPATIENT)
Dept: PAIN MEDICINE | Facility: CLINIC | Age: 45
End: 2025-03-28

## 2025-04-16 ENCOUNTER — OFFICE VISIT (OUTPATIENT)
Dept: PAIN MEDICINE | Facility: CLINIC | Age: 45
End: 2025-04-16
Payer: COMMERCIAL

## 2025-04-16 VITALS — HEIGHT: 69 IN | BODY MASS INDEX: 30.81 KG/M2 | WEIGHT: 208 LBS

## 2025-04-16 DIAGNOSIS — M54.16 LUMBAR RADICULOPATHY: Primary | ICD-10-CM

## 2025-04-16 DIAGNOSIS — G89.29 CHRONIC BILATERAL LOW BACK PAIN WITH RIGHT-SIDED SCIATICA: ICD-10-CM

## 2025-04-16 DIAGNOSIS — M54.41 CHRONIC BILATERAL LOW BACK PAIN WITH RIGHT-SIDED SCIATICA: ICD-10-CM

## 2025-04-16 PROCEDURE — 99214 OFFICE O/P EST MOD 30 MIN: CPT | Performed by: NURSE PRACTITIONER

## 2025-04-16 RX ORDER — PREDNISONE 10 MG/1
TABLET ORAL
Qty: 30 TABLET | Refills: 0 | Status: SHIPPED | OUTPATIENT
Start: 2025-04-16

## 2025-04-16 NOTE — PROGRESS NOTES
Assessment:  1. Lumbar radiculopathy    2. Chronic bilateral low back pain with right-sided sciatica        Plan:  While the patient was in the office today, I did have a thorough conversation regarding their chronic pain syndrome, medication management, and treatment plan options.    44 y.o. female who presents to [facility] for interval re-evaluation in regards to pain in the right Groin and right Hip. The patients last office visit was 2/7/2025. Patient presents today with pain in right hip/ groin that radiates to right leg lateral and posterior. Pain is described as Constant, Dull-aching, and Throbbing. Symptoms are worse long sitting/ driving, standing, walking. Alleviating factors identifiable by the patient are rest. On the numeric pain scale of 1-10, the pain typically increases to max of 4 out of 10, which is currently impacting their quality of life.    Patient has not been able to get her MRI secondary to being declined by insurance.  I reordered the MRI of her lumbar spine now that she has undergone physical therapy and completed at least 8+ weeks of home exercises provided by physical therapy.    Patient reports she had relief of her pain after a Medrol Dosepak back in January.  I will start her on a prednisone taper 30 mg x 5 days followed by 20 mg x 5 days followed by 10 mg x 5 days.    Follow-up is planned  after MRI is completed  or sooner as warranted. The patient was advised to contact the office should their symptoms worsen in the interim.     My impressions and treatment recommendations were discussed in detail with the patient who verbalized understanding and had no further questions.  Discharge instructions were provided. I personally saw and examined the patient and I agree with the above discussed plan of care.    Orders Placed This Encounter   Procedures    MRI lumbar spine wo contrast     Standing Status:   Future     Expected Date:   4/16/2025     Expiration  Date:   4/16/2029     Scheduling Instructions:      There is no preparation for this test. Please leave your jewelry and valuables at home, wedding rings are the exception. All patients will be required to change into a hospital gown and pants.  Street clothes are not permitted in the MRI.  Magnetic nail polish must be removed prior to arrival for your test. Please bring your insurance cards, a form of photo ID and a list of your medications with you. Arrive 15 minutes prior to your appointment time in order to register. Please bring any prior CT or MRI studies of this area that were not performed at a [facility] facility.            To schedule this appointment, please contact Central Scheduling at [phone].            Prior to your appointment, please make sure you complete the MRI Screening Form when you e-Check in for your appointment. This will be available starting 7 days before your appointment in [employer]. You may receive an e-mail with an activation code if you do not have a [employer] account. If you do not have access to a device, we will complete your screening at your appointment.     Reason for Exam:   back pain with radiculopathy     Is the patient pregnant?:   No     What is the patient's sedation requirement?:   No Sedation     Does the patient need medication for Claustrophobia? If yes, order medication at this point.:   No     Does the patient wear a life vest, have an implanted cardiac device, a stimulation device, a sleep apnea stimulator, or a breast tissue expansion device?:   No     Release to patient through [facility]:   Immediate     New Medications Ordered This Visit   Medications    predniSONE 10 mg tablet     Sig: Take 3 tablets for 5 days then 2 tablets for 5 days then 1 tablet for 5 days     Dispense:  30 tablet     Refill:  0       History of Present Illness:  Dawna Apple is a 44 y.o. female who presents to [facility] for interval re-evaluation in regards  to pain in the right Groin and right Hip. The patients last office visit was 2/7/2025. Patient presents today with pain in right hip/ groin that radiates to right leg lateral and posterior . Pain is described as Constant, Dull-aching, and Throbbing. Symptoms are worse long sitting/ driving, standing, walking. Alleviating factors identifiable by the patient are rest. On the numeric pain scale of 1-10, the pain typically increases to max of 4 out of 10, which is currently impacting their quality of life.    Current pain meds include: Aleve/ Naproxen OTC    Conservative therapy: PT; she completed 2 session of PT 11/27 and 12/4/2024 and Home exercises; she was given exercise program by PT and has been completing at home 3-4 days/week for 20-30 minutes each day and occurring since 12/4/2024.  Previous treatments:  right lateral femoral cutaneous block 2/27/2025 w/ no relief and a right ileopsoas injection 3/14/2025 w/ no relief.         I have personally reviewed and/or updated the patient's past medical history, past surgical history, family history, social history, current medications, allergies, and vital signs today.     Review of Systems   Respiratory:  Negative for shortness of breath.    Cardiovascular:  Negative for chest pain.   Gastrointestinal:  Negative for constipation, diarrhea, nausea and vomiting.   Musculoskeletal:  Negative for arthralgias, gait problem, joint swelling and myalgias.        Right hip pain  Right leg pain   Skin:  Negative for rash.   Neurological:  Negative for dizziness, seizures and weakness.   All other systems reviewed and are negative.      Patient Active Problem List   Diagnosis    Multiple lung nodules on CT    Mild intermittent asthma without complication    Wegener's granulomatosis without renal involvement (HCC)    Nasal dryness    Nasal vestibulitis    Annual physical exam    Stridor    Other headache syndrome    Gastroesophageal reflux disease    Acute bilateral low back pain     Herpes stomatitis    Herpes simplex infection of genitourinary system    Psoas tendonitis of right side    Meralgia paraesthetica, right    Iliopsoas bursitis of right hip       Past Medical History:   Diagnosis Date    Asthma     Disease of thyroid gland     Multiple lung nodules on CT 8/16/2019    Shortness of breath 8/16/2019    Thyroid cyst     Wegener's granulomatosis     Wegener's granulomatosis        Past Surgical History:   Procedure Laterality Date    MOUTH SURGERY      US GUIDED FINE NEEDLE ASPIRATION (ALL INC)  4/10/2014    US GUIDED THYROID BIOPSY  2014       Family History   Problem Relation Age of Onset    Hypertension Mother     No Known Problems Father     No Known Problems Brother     Stroke Maternal Grandmother     Dementia Maternal Grandfather     Diabetes Paternal Grandmother     Dementia Paternal Grandfather     No Known Problems Half-Sister     No Known Problems Half-Sister     No Known Problems Half-Sister     No Known Problems Maternal Aunt     No Known Problems Maternal Aunt     No Known Problems Maternal Aunt     No Known Problems Maternal Aunt     No Known Problems Maternal Aunt     No Known Problems Maternal Aunt     No Known Problems Paternal Aunt     No Known Problems Paternal Aunt     No Known Problems Paternal Aunt     Colon cancer Maternal Uncle 62    Breast cancer Cousin 29    BRCA 1/2 Cousin         unknown result       Social History     Occupational History    Occupation: Investigator   Tobacco Use    Smoking status: Never    Smokeless tobacco: Never   Vaping Use    Vaping status: Never Used   Substance and Sexual Activity    Alcohol use: Yes     Comment: social    Drug use: Never    Sexual activity: Yes     Partners: Male     Birth control/protection: None       Current Outpatient Medications on File Prior to Visit   Medication Sig    albuterol (PROVENTIL HFA,VENTOLIN HFA) 90 mcg/act inhaler Inhale 2 puffs every 4 (four) hours as needed for wheezing    Ascorbic Acid (vitamin  "C) 1000 MG tablet Take 1,000 mg by mouth daily    azithromycin (ZITHROMAX) 500 MG tablet     b complex vitamins capsule Take 1 capsule by mouth daily    Biotin 10 MG CAPS Take by mouth    fluticasone (Flovent HFA) 110 MCG/ACT inhaler Inhale 2 puffs 2 (two) times a day Rinse mouth after use.    Fluticasone Propionate 93 MCG/ACT EXHU 1 actuation into each nostril 2 (two) times a day    glucosamine-chondroitin 500-400 MG tablet Take 1 tablet by mouth 3 (three) times a day    Multiple Vitamins-Minerals (multivitamin with minerals) tablet Take 1 tablet by mouth daily    omeprazole (PriLOSEC) 40 MG capsule TAKE 1 CAPSULE BY MOUTH  DAILY 1/2 HOUR BEFORE  BREAKFAST    methocarbamol (ROBAXIN) 500 mg tablet Take 1 tablet (500 mg total) by mouth 3 (three) times a day (Patient not taking: Reported on 2/7/2025)    methylPREDNISolone 4 MG tablet therapy pack Use as directed on package (Patient not taking: Reported on 2/7/2025)    traMADol (Ultram) 50 mg tablet Take 1 tablet (50 mg total) by mouth every 6 (six) hours as needed for moderate pain (Patient not taking: Reported on 2/7/2025)    valACYclovir (VALTREX) 500 mg tablet Take 1 tablet (500 mg total) by mouth 3 (three) times a day for 7 days     No current facility-administered medications on file prior to visit.       Allergies   Allergen Reactions    Ibuprofen Hives    Other Eye Swelling     seasonal       Physical Exam:    Ht 5' 9\" (1.753 m)   Wt 94.3 kg (208 lb)   BMI 30.72 kg/m²     Constitutional:normal, well developed, well nourished, alert, in no distress and non-toxic and no overt pain behavior. and overweight  Eyes:anicteric  HEENT:grossly intact  Neck:supple, symmetric, trachea midline and no masses   Pulmonary:even and unlabored  Cardiovascular:No edema or pitting edema present  Skin:Normal without rashes or lesions and well hydrated  Psychiatric:Mood and affect appropriate  Neurologic:Cranial Nerves II-XII grossly intact  Musculoskeletal: normal gait     Lumbar " spine no tenderness to palpation  Tenderness to palpation right hip and groin    Imaging

## 2025-04-16 NOTE — PROGRESS NOTES
Assessment    No diagnosis found.    Plan  The spinal cord stimulator trial was discussed in depth with the patient. The patient was advised that a stimulator lead will be placed percutaneously through an epidural needle, with intra-op stimulation done to confirm appropriate lead placement.  The lead will then be connected to an external generator and secured to the skin.  The patient was advised that an industry clinical specialist will be present for the trial, program the stimulator and explain how to use it.  During the trial, the patient was instructed to perform their activities of daily living, but limit twisting and bending motions, and no lifting greater than 10 pounds.  The patient was advised to refrain from tub baths, showers, swimming, and hot tubs during the trial. The patient will return to the office for trial evaluation and lead removal on ***. At that time, if the trial is successful, the patient will be referred to {Stim Surgeon:178958} for permanent spinal cord stimulator placement.     Pre-procedure instructions were reviewed with the patient. The patient was given a prescription for blood work to be done by ***. Complete risks and benefits including bleeding, infection, headache, tissue reaction, nerve injury and allergic reaction were discussed. The approach was demonstrated using models and literature was provided. The patient was advised that they would receive pre-procedure antibiotics and a prescription to take during the week of the trial.    The patient was advised to hold *** starting on *** and for the duration of the trial    The patient will next follow- up on *** for the stimulator trial.    No orders of the defined types were placed in this encounter.      History of Present Illness  The patient is a 44 y.o. female scheduled for an {Spine Cord Stim Trial:84963} spinal cord stimulator trial to treat chronic pain from ***.  The current pain pattern includes ***.   The patient's goals  for the trial include ***.    Pain Assessment Measures   Numeric Rating Scale ***   Oswestry Disability Index Score/Neck Disability Index Score ***   PROMIS-29   Physical Function ***   Anxiety ***   Depression ***   Fatigue ***   Sleep Disturbance ***   Ability to Participate in Social Roles And Activities ***   Pain Interference ***     The patient is currently on ***; an anti-coagulation hold was approved by ***    I have personally reviewed and/or updated the patient's past medical history, past surgical history, family history, social history, current medications, allergies, and vital signs today.     Review of Systems     Past Medical History:   Diagnosis Date    Asthma     Disease of thyroid gland     Multiple lung nodules on CT 8/16/2019    Shortness of breath 8/16/2019    Thyroid cyst     Wegener's granulomatosis     Wegener's granulomatosis        Past Surgical History:   Procedure Laterality Date    MOUTH SURGERY      US GUIDED FINE NEEDLE ASPIRATION (ALL INC)  4/10/2014    US GUIDED THYROID BIOPSY  2014       Family History   Problem Relation Age of Onset    Hypertension Mother     No Known Problems Father     No Known Problems Brother     Stroke Maternal Grandmother     Dementia Maternal Grandfather     Diabetes Paternal Grandmother     Dementia Paternal Grandfather     No Known Problems Half-Sister     No Known Problems Half-Sister     No Known Problems Half-Sister     No Known Problems Maternal Aunt     No Known Problems Maternal Aunt     No Known Problems Maternal Aunt     No Known Problems Maternal Aunt     No Known Problems Maternal Aunt     No Known Problems Maternal Aunt     No Known Problems Paternal Aunt     No Known Problems Paternal Aunt     No Known Problems Paternal Aunt     Colon cancer Maternal Uncle 62    Breast cancer Cousin 29    BRCA 1/2 Cousin         unknown result       Social History     Occupational History    Occupation: Investigator   Tobacco Use    Smoking status: Never     Smokeless tobacco: Never   Vaping Use    Vaping status: Never Used   Substance and Sexual Activity    Alcohol use: Yes     Comment: social    Drug use: Never    Sexual activity: Yes     Partners: Male     Birth control/protection: None         Current Outpatient Medications:     albuterol (PROVENTIL HFA,VENTOLIN HFA) 90 mcg/act inhaler, Inhale 2 puffs every 4 (four) hours as needed for wheezing, Disp: 1 Inhaler, Rfl: 3    Ascorbic Acid (vitamin C) 1000 MG tablet, Take 1,000 mg by mouth daily, Disp: , Rfl:     azithromycin (ZITHROMAX) 500 MG tablet, , Disp: , Rfl:     b complex vitamins capsule, Take 1 capsule by mouth daily, Disp: , Rfl:     Biotin 10 MG CAPS, Take by mouth, Disp: , Rfl:     fluticasone (Flovent HFA) 110 MCG/ACT inhaler, Inhale 2 puffs 2 (two) times a day Rinse mouth after use., Disp: 12 g, Rfl: 0    Fluticasone Propionate 93 MCG/ACT EXHU, 1 actuation into each nostril 2 (two) times a day, Disp: 16 mL, Rfl: 6    glucosamine-chondroitin 500-400 MG tablet, Take 1 tablet by mouth 3 (three) times a day, Disp: , Rfl:     Multiple Vitamins-Minerals (multivitamin with minerals) tablet, Take 1 tablet by mouth daily, Disp: , Rfl:     omeprazole (PriLOSEC) 40 MG capsule, TAKE 1 CAPSULE BY MOUTH  DAILY 1/2 HOUR BEFORE  BREAKFAST, Disp: 90 capsule, Rfl: 3    methocarbamol (ROBAXIN) 500 mg tablet, Take 1 tablet (500 mg total) by mouth 3 (three) times a day (Patient not taking: Reported on 2/7/2025), Disp: 90 tablet, Rfl: 0    methylPREDNISolone 4 MG tablet therapy pack, Use as directed on package (Patient not taking: Reported on 2/7/2025), Disp: 21 each, Rfl: 0    traMADol (Ultram) 50 mg tablet, Take 1 tablet (50 mg total) by mouth every 6 (six) hours as needed for moderate pain (Patient not taking: Reported on 2/7/2025), Disp: 30 tablet, Rfl: 0    valACYclovir (VALTREX) 500 mg tablet, Take 1 tablet (500 mg total) by mouth 3 (three) times a day for 7 days, Disp: 90 tablet, Rfl: 3    Allergies   Allergen  "Reactions    Ibuprofen Hives    Other Eye Swelling     seasonal       Physical Exam    Ht 5' 9\" (1.753 m)   Wt 94.3 kg (208 lb)   BMI 30.72 kg/m²     Constitutional:{General Appearance:03734::\"normal, well developed, well nourished, alert, in no distress and non-toxic and no overt pain behavior.\"}  Eyes:{Sclera:16670::\"anicteric\"}  HEENT:{Hearin::\"grossly intact\"}  Neck:{Neck:86859::\"supple, symmetric, trachea midline and no masses \"}  Pulmonary:{Respiratory effort:37608::\"even and unlabored\"}  Cardiovascular:{Examination of Extremities:09637::\"No edema or pitting edema present\"}  Skin:{Skin and Subcutaneous tissues:58420::\"Normal without rashes or lesions and well hydrated\"}  Psychiatric:{Mood and Affect:68607::\"Mood and affect appropriate\"}  Neurologic:{Cranial Nerves:42686::\"Cranial Nerves II-XII grossly intact\"}  Musculoskeletal:{Gait and Station:13277::\"normal\"}    Imaging            "

## 2025-04-16 NOTE — PROGRESS NOTES
Assessment:  No diagnosis found.    Plan:  ***    {Oral Swab Statement:95622}    {Opioid Statement:43650}    {UDS Statement:95174}    {PDMP Statement:86216}    {Pain Management Procedure Statement:46822}    My impressions and treatment recommendations were discussed in detail with the patient who verbalized understanding and had no further questions.  Discharge instructions were provided. I personally saw and examined the patient and I agree with the above discussed plan of care.    No orders of the defined types were placed in this encounter.    No orders of the defined types were placed in this encounter.      History of Present Illness:  Dawna Apple is a 44 y.o. female who presents for a follow up office visit in regards to Hip Pain (Right hip pain  - s/p right Iliopsoas Bursa Injection done 3/14/2025.) and Leg Pain (Right leg pain).   The patient’s current symptoms include ***    {SPA Pain Assessment:94343}    Opioid contract date    Last UDS Date                      last taken on        I have personally reviewed and/or updated the patient's past medical history, past surgical history, family history, social history, current medications, allergies, and vital signs today.     Review of Systems   Respiratory:  Negative for shortness of breath.    Cardiovascular:  Negative for chest pain.   Gastrointestinal:  Negative for constipation, diarrhea, nausea and vomiting.   Musculoskeletal:  Negative for arthralgias, gait problem, joint swelling and myalgias.        Right hip pain   Right leg pain   Skin:  Negative for rash.   Neurological:  Negative for dizziness, seizures and weakness.   All other systems reviewed and are negative.      Patient Active Problem List   Diagnosis    Multiple lung nodules on CT    Mild intermittent asthma without complication    Wegener's granulomatosis without renal involvement (HCC)    Nasal dryness    Nasal vestibulitis    Annual physical exam    Stridor    Other headache syndrome     Gastroesophageal reflux disease    Acute bilateral low back pain    Herpes stomatitis    Herpes simplex infection of genitourinary system    Psoas tendonitis of right side    Meralgia paraesthetica, right    Iliopsoas bursitis of right hip       Past Medical History:   Diagnosis Date    Asthma     Disease of thyroid gland     Multiple lung nodules on CT 8/16/2019    Shortness of breath 8/16/2019    Thyroid cyst     Wegener's granulomatosis     Wegener's granulomatosis        Past Surgical History:   Procedure Laterality Date    MOUTH SURGERY      US GUIDED FINE NEEDLE ASPIRATION (ALL INC)  4/10/2014    US GUIDED THYROID BIOPSY  2014       Family History   Problem Relation Age of Onset    Hypertension Mother     No Known Problems Father     No Known Problems Brother     Stroke Maternal Grandmother     Dementia Maternal Grandfather     Diabetes Paternal Grandmother     Dementia Paternal Grandfather     No Known Problems Half-Sister     No Known Problems Half-Sister     No Known Problems Half-Sister     No Known Problems Maternal Aunt     No Known Problems Maternal Aunt     No Known Problems Maternal Aunt     No Known Problems Maternal Aunt     No Known Problems Maternal Aunt     No Known Problems Maternal Aunt     No Known Problems Paternal Aunt     No Known Problems Paternal Aunt     No Known Problems Paternal Aunt     Colon cancer Maternal Uncle 62    Breast cancer Cousin 29    BRCA 1/2 Cousin         unknown result       Social History     Occupational History    Occupation: Investigator   Tobacco Use    Smoking status: Never    Smokeless tobacco: Never   Vaping Use    Vaping status: Never Used   Substance and Sexual Activity    Alcohol use: Yes     Comment: social    Drug use: Never    Sexual activity: Yes     Partners: Male     Birth control/protection: None       Current Outpatient Medications on File Prior to Visit   Medication Sig    albuterol (PROVENTIL HFA,VENTOLIN HFA) 90 mcg/act inhaler Inhale 2 puffs  "every 4 (four) hours as needed for wheezing    Ascorbic Acid (vitamin C) 1000 MG tablet Take 1,000 mg by mouth daily    azithromycin (ZITHROMAX) 500 MG tablet     b complex vitamins capsule Take 1 capsule by mouth daily    Biotin 10 MG CAPS Take by mouth    fluticasone (Flovent HFA) 110 MCG/ACT inhaler Inhale 2 puffs 2 (two) times a day Rinse mouth after use.    Fluticasone Propionate 93 MCG/ACT EXHU 1 actuation into each nostril 2 (two) times a day    glucosamine-chondroitin 500-400 MG tablet Take 1 tablet by mouth 3 (three) times a day    methocarbamol (ROBAXIN) 500 mg tablet Take 1 tablet (500 mg total) by mouth 3 (three) times a day (Patient not taking: Reported on 2025)    methylPREDNISolone 4 MG tablet therapy pack Use as directed on package (Patient not taking: Reported on 2025)    Multiple Vitamins-Minerals (multivitamin with minerals) tablet Take 1 tablet by mouth daily    omeprazole (PriLOSEC) 40 MG capsule TAKE 1 CAPSULE BY MOUTH  DAILY 1/2 HOUR BEFORE  BREAKFAST    traMADol (Ultram) 50 mg tablet Take 1 tablet (50 mg total) by mouth every 6 (six) hours as needed for moderate pain (Patient not taking: Reported on 2025)    valACYclovir (VALTREX) 500 mg tablet Take 1 tablet (500 mg total) by mouth 3 (three) times a day for 7 days     No current facility-administered medications on file prior to visit.       Allergies   Allergen Reactions    Ibuprofen Hives    Other Eye Swelling     seasonal       Physical Exam:    There were no vitals taken for this visit.    Constitutional:{General Appearance:58029::\"normal, well developed, well nourished, alert, in no distress and non-toxic and no overt pain behavior.\"}  Eyes:{Sclera:60392::\"anicteric\"}  HEENT:{Hearin::\"grossly intact\"}  Neck:{Neck:73867::\"supple, symmetric, trachea midline and no masses \"}  Pulmonary:{Respiratory effort:38591::\"even and unlabored\"}  Cardiovascular:{Examination of Extremities:93113::\"No edema or pitting edema " "present\"}  Skin:{Skin and Subcutaneous tissues:34172::\"Normal without rashes or lesions and well hydrated\"}  Psychiatric:{Mood and Affect:45032::\"Mood and affect appropriate\"}  Neurologic:{Cranial Nerves:07766::\"Cranial Nerves II-XII grossly intact\"}  Musculoskeletal:{Gair and Station:90077::\"normal\"}    Imaging    "

## 2025-05-08 ENCOUNTER — HOSPITAL ENCOUNTER (OUTPATIENT)
Dept: RADIOLOGY | Facility: HOSPITAL | Age: 45
Discharge: HOME | End: 2025-05-08
Attending: NURSE PRACTITIONER
Payer: COMMERCIAL

## 2025-05-08 DIAGNOSIS — G89.29 CHRONIC BILATERAL LOW BACK PAIN WITH RIGHT-SIDED SCIATICA: ICD-10-CM

## 2025-05-08 DIAGNOSIS — M54.16 LUMBAR RADICULOPATHY: Primary | ICD-10-CM

## 2025-05-08 DIAGNOSIS — M54.16 LUMBAR RADICULOPATHY: ICD-10-CM

## 2025-05-08 DIAGNOSIS — M54.41 CHRONIC BILATERAL LOW BACK PAIN WITH RIGHT-SIDED SCIATICA: ICD-10-CM

## 2025-05-08 PROCEDURE — 72148 MRI LUMBAR SPINE W/O DYE: CPT

## 2025-05-08 NOTE — PROGRESS NOTES
Spoke to patient and reviewed results of her MRI. Discussed options and answered her questions. Will order a right L3-L4 TFESI to see if this gives her some relief of her symptoms/ pain.

## 2025-06-12 ENCOUNTER — HOSPITAL ENCOUNTER (OUTPATIENT)
Dept: RADIOLOGY | Facility: CLINIC | Age: 45
Discharge: HOME | End: 2025-06-12
Attending: NURSE PRACTITIONER
Payer: COMMERCIAL

## 2025-06-12 VITALS
HEART RATE: 76 BPM | SYSTOLIC BLOOD PRESSURE: 116 MMHG | DIASTOLIC BLOOD PRESSURE: 76 MMHG | TEMPERATURE: 97.9 F | RESPIRATION RATE: 20 BRPM | OXYGEN SATURATION: 95 %

## 2025-06-12 DIAGNOSIS — M54.16 LUMBAR RADICULOPATHY: ICD-10-CM

## 2025-06-12 PROCEDURE — 64484 NJX AA&/STRD TFRM EPI L/S EA: CPT | Performed by: PHYSICAL MEDICINE & REHABILITATION

## 2025-06-12 PROCEDURE — 64483 NJX AA&/STRD TFRM EPI L/S 1: CPT | Performed by: PHYSICAL MEDICINE & REHABILITATION

## 2025-06-12 RX ORDER — BUPIVACAINE HCL/PF 2.5 MG/ML
2 VIAL (ML) INJECTION ONCE
Status: COMPLETED | OUTPATIENT
Start: 2025-06-12 | End: 2025-06-12

## 2025-06-12 RX ORDER — PAPAVERINE HCL 150 MG
10 CAPSULE, EXTENDED RELEASE ORAL ONCE
Status: COMPLETED | OUTPATIENT
Start: 2025-06-12 | End: 2025-06-12

## 2025-06-12 RX ADMIN — BUPIVACAINE HYDROCHLORIDE 2 ML: 2.5 INJECTION, SOLUTION EPIDURAL; INFILTRATION; INTRACAUDAL at 15:05

## 2025-06-12 RX ADMIN — DEXAMETHASONE SODIUM PHOSPHATE 10 MG: 10 INJECTION, SOLUTION INTRAMUSCULAR; INTRAVENOUS at 15:05

## 2025-06-12 RX ADMIN — IOHEXOL 2 ML: 300 INJECTION, SOLUTION INTRAVENOUS at 15:05

## 2025-06-12 NOTE — H&P
History of Present Illness: The patient is a 44 y.o. female who presents with complaints of right sided low back pain  Past Medical History[1]    Past Surgical History[2]    Current Medications[3]    Allergies[4]    Physical Exam:   Vitals:    06/12/25 1449   BP: 125/80   Pulse: 83   Resp: 20   Temp: 97.9 °F (36.6 °C)   SpO2: 97%     General: Awake, Alert, Oriented x 3, Mood and affect appropriate  Respiratory: Respirations even and unlabored  Cardiovascular: Peripheral pulses intact; no edema  Musculoskeletal Exam: Tenderness to palpation right side lumbar paraspinals  ASA Score: 2  Patient/Chart Verification  Patient ID Verified: Verbal  Consents Confirmed: To be obtained in the Procedural area  Interval H&P(within 24 hr) Complete (required for Outpatients and Surgery Admit only): To be obtained in the Procedural area  Allergies Reviewed: Yes  Anticoag/NSAID held?: NA  Currently on antibiotics?: No  Pregnancy denied?: Yes    Assessment:   1. Lumbar radiculopathy        Plan: Right L3-L4 TFESI        [1]   Past Medical History:  Diagnosis Date    Asthma     Disease of thyroid gland     Multiple lung nodules on CT 8/16/2019    Shortness of breath 8/16/2019    Thyroid cyst     Wegener's granulomatosis     Wegener's granulomatosis    [2]   Past Surgical History:  Procedure Laterality Date    MOUTH SURGERY      US GUIDED FINE NEEDLE ASPIRATION (ALL INC)  4/10/2014    US GUIDED THYROID BIOPSY  2014   [3]   Current Outpatient Medications:     albuterol (PROVENTIL HFA,VENTOLIN HFA) 90 mcg/act inhaler, Inhale 2 puffs every 4 (four) hours as needed for wheezing, Disp: 1 Inhaler, Rfl: 3    Ascorbic Acid (vitamin C) 1000 MG tablet, Take 1,000 mg by mouth daily, Disp: , Rfl:     b complex vitamins capsule, Take 1 capsule by mouth daily, Disp: , Rfl:     Biotin 10 MG CAPS, Take by mouth, Disp: , Rfl:     fluticasone (Flovent HFA) 110 MCG/ACT inhaler, Inhale 2 puffs 2 (two) times a day Rinse mouth after use., Disp: 12 g, Rfl:  0    Fluticasone Propionate 93 MCG/ACT EXHU, 1 actuation into each nostril 2 (two) times a day, Disp: 16 mL, Rfl: 6    glucosamine-chondroitin 500-400 MG tablet, Take 1 tablet by mouth 3 (three) times a day, Disp: , Rfl:     methocarbamol (ROBAXIN) 500 mg tablet, Take 1 tablet (500 mg total) by mouth 3 (three) times a day (Patient not taking: Reported on 2/7/2025), Disp: 90 tablet, Rfl: 0    Multiple Vitamins-Minerals (multivitamin with minerals) tablet, Take 1 tablet by mouth daily, Disp: , Rfl:     omeprazole (PriLOSEC) 40 MG capsule, TAKE 1 CAPSULE BY MOUTH  DAILY 1/2 HOUR BEFORE  BREAKFAST, Disp: 90 capsule, Rfl: 3    traMADol (Ultram) 50 mg tablet, Take 1 tablet (50 mg total) by mouth every 6 (six) hours as needed for moderate pain (Patient not taking: Reported on 2/7/2025), Disp: 30 tablet, Rfl: 0    valACYclovir (VALTREX) 500 mg tablet, Take 1 tablet (500 mg total) by mouth 3 (three) times a day for 7 days, Disp: 90 tablet, Rfl: 3    Current Facility-Administered Medications:     bupivacaine (PF) (MARCAINE) 0.25 % injection 2 mL, 2 mL, Epidural, Once, Magdiel Purvis DO    dexamethasone (PF) (DECADRON) injection 10 mg, 10 mg, Epidural, Once, Magdiel Purvis DO    iohexol (OMNIPAQUE) 300 mg/mL injection 50 mL, 50 mL, Epidural, Once, Magdiel Purvis DO  [4]   Allergies  Allergen Reactions    Ibuprofen Hives    Other Eye Swelling     seasonal

## 2025-06-26 ENCOUNTER — TELEPHONE (OUTPATIENT)
Dept: RADIOLOGY | Facility: MEDICAL CENTER | Age: 45
End: 2025-06-26

## 2025-07-03 ENCOUNTER — ANNUAL EXAM (OUTPATIENT)
Dept: OBGYN CLINIC | Facility: CLINIC | Age: 45
End: 2025-07-03
Payer: COMMERCIAL

## 2025-07-03 VITALS
DIASTOLIC BLOOD PRESSURE: 86 MMHG | WEIGHT: 214 LBS | SYSTOLIC BLOOD PRESSURE: 126 MMHG | HEIGHT: 69 IN | BODY MASS INDEX: 31.7 KG/M2

## 2025-07-03 DIAGNOSIS — Z01.411 ENCOUNTER FOR GYNECOLOGICAL EXAMINATION WITH ABNORMAL FINDING: Primary | ICD-10-CM

## 2025-07-03 DIAGNOSIS — N93.9 ABNORMAL UTERINE BLEEDING (AUB): ICD-10-CM

## 2025-07-03 PROBLEM — Z00.00 ANNUAL PHYSICAL EXAM: Status: RESOLVED | Noted: 2021-08-30 | Resolved: 2025-07-03

## 2025-07-03 PROCEDURE — 88141 CYTOPATH C/V INTERPRET: CPT | Performed by: PATHOLOGY

## 2025-07-03 PROCEDURE — G0143 SCR C/V CYTO,THINLAYER,RESCR: HCPCS | Performed by: PATHOLOGY

## 2025-07-03 PROCEDURE — 99396 PREV VISIT EST AGE 40-64: CPT | Performed by: PHYSICIAN ASSISTANT

## 2025-07-03 PROCEDURE — G0476 HPV COMBO ASSAY CA SCREEN: HCPCS | Performed by: PHYSICIAN ASSISTANT

## 2025-07-03 NOTE — PATIENT INSTRUCTIONS
Go for mammogram as scheduled.    Go for pelvic ultrasound and blood work.    Follow up for endometrial biopsy and menopause consult.    Call with problems.

## 2025-07-03 NOTE — PROGRESS NOTES
Name: Dawna Apple      : 1980      MRN: [id number]  Encounter Provider: Brittanie Thornton PA-C  Encounter Date: 7/3/2025   Encounter department: [facility] [place]  :  Assessment & Plan  Encounter for gynecological examination with abnormal finding    Orders:    Liquid-based pap, screening    Abnormal uterine bleeding (AUB)    Orders:    US pelvis complete w transvaginal; Future    TSH, 3rd generation; Future    T4, free; Future    Pap done.  Order for mammogram already in Epic.  Orders for pelvic U/S and thyroid labs entered; we will call with results.  Will f/u with Dr Cornejo for ebx and menopause discussion.  Call with problems in the interim.    History of Present Illness   Patient is here for yearly gyn exam.  States she is doing well overall.  Periods have become unpredictable over the last 4-5 mos.  Sometimes a day or two early or late, can be as much as 2 weeks late.  Bleeding lasts for 5 days and has gotten heavy.  Changing her pad every hour on her heaviest day.  Experiencing multiple perimenopausal symptoms - hot flashes, brain fog, sleep disturbance, weight gain, hair changes, mood swings, and joint pain.  Denies bowel/bladder changes, pelvic pain, bloating, abdominal pain, n/v, change in appetite, and thyroid disease.  Recently had 2 iron infusions for anemia.  Colonoscopy/EGD completed.  Wegener's under good control.    Mammogram scheduled for November.  Patient is performing self-breast exam.  Denies new masses, skin changes, nipple discharge, and pain/tenderness.      Dawna Apple is a 44 y.o. female who presents for yearly gyn exam.  History obtained from: patient    Review of Systems   Constitutional:  Positive for diaphoresis (Hot flashes). Negative for appetite change and unexpected weight change.   Cardiovascular:         No masses, skin changes, nipple discharge, and pain/tenderness.   Gastrointestinal:  Negative for abdominal distention, abdominal  "pain, constipation, diarrhea, nausea and vomiting.   Genitourinary:  Positive for menstrual problem (AUB). Negative for difficulty urinating, dysuria, frequency, genital sores, hematuria, pelvic pain, urgency, vaginal bleeding, vaginal discharge and vaginal pain.   Musculoskeletal:  Positive for arthralgias.   Psychiatric/Behavioral:  Positive for sleep disturbance.           Objective   /86 (BP Location: Left arm, Patient Position: Sitting, Cuff Size: Adult)   Ht 5' 9\" (1.753 m)   Wt 97.1 kg (214 lb)   LMP 07/02/2025 (Exact Date)   BMI 31.60 kg/m²      Physical Exam  Vitals reviewed. Exam conducted with a chaperone present.   Constitutional:       Appearance: Normal appearance. She is well-developed.   Neck:      Thyroid: No thyromegaly.   Pulmonary:      Effort: Pulmonary effort is normal.   Chest:   Breasts:     Breasts are symmetrical.      Right: Normal. No swelling, bleeding, inverted nipple, mass, nipple discharge, skin change or tenderness.      Left: Normal. No swelling, bleeding, inverted nipple, mass, nipple discharge, skin change or tenderness.   Abdominal:      General: There is no distension.      Palpations: Abdomen is soft.      Tenderness: There is no abdominal tenderness.   Genitourinary:     General: Normal vulva.      Pubic Area: No rash.       Labia:         Right: No rash, tenderness, lesion or injury.         Left: No rash, tenderness, lesion or injury.       Vagina: Normal. No vaginal discharge, erythema, tenderness or bleeding.      Cervix: Cervical bleeding (Has her period) present.      Uterus: Normal.       Adnexa: Right adnexa normal and left adnexa normal.        Right: No mass, tenderness or fullness.          Left: No mass, tenderness or fullness.       Musculoskeletal:      Cervical back: Neck supple.   Lymphadenopathy:      Cervical: No cervical adenopathy.      Upper Body:      Right upper body: No supraclavicular or axillary adenopathy.      Left upper body: No " supraclavicular or axillary adenopathy.      Lower Body: No right inguinal adenopathy. No left inguinal adenopathy.     Skin:     General: Skin is warm and dry.     Neurological:      Mental Status: She is alert and oriented to person, place, and time.     Psychiatric:         Behavior: Behavior normal. Behavior is cooperative.         Thought Content: Thought content normal.         Judgment: Judgment normal.

## 2025-07-07 ENCOUNTER — EVALUATION (OUTPATIENT)
Dept: PHYSICAL THERAPY | Facility: CLINIC | Age: 45
End: 2025-07-07
Attending: ORTHOPAEDIC SURGERY
Payer: COMMERCIAL

## 2025-07-07 DIAGNOSIS — M76.11 PSOAS TENDONITIS OF RIGHT SIDE: ICD-10-CM

## 2025-07-07 PROCEDURE — 97110 THERAPEUTIC EXERCISES: CPT

## 2025-07-07 PROCEDURE — 97112 NEUROMUSCULAR REEDUCATION: CPT

## 2025-07-07 PROCEDURE — 97161 PT EVAL LOW COMPLEX 20 MIN: CPT

## 2025-07-07 NOTE — PROGRESS NOTES
PT Evaluation     Today's date: 2025  Patient name: Dawna Apple  : 1980  MRN: [id number]  Referring provider: Manuel Crews DO  Dx:   Encounter Diagnosis     ICD-10-CM    1. Psoas tendonitis of right side  M76.11 Ambulatory referral to Physical Therapy          Start Time: 1630  Stop Time: 1725  Total time in clinic (min): 55 minutes    Assessment  Impairments: abnormal muscle firing, abnormal muscle tone, abnormal or restricted ROM, abnormal movement, activity intolerance, impaired physical strength, lacks appropriate home exercise program, pain with function, poor posture , poor body mechanics, participation limitations, activity limitations and endurance  Symptom irritability: high    Assessment details: Dawna Apple is a 44 y.o. year old female presenting with CC of chronic LBP with radiating pain and n/t into the RLE. Patients primary impairments include decreased l/s ROM, hypomobile and painful L2-4, + SLR on R. Noted impairments limit the patients ability to drive for her work commute, sit while working, stand/ambulate >10-15 minutes without limitation d/t pain, as well as engage in her typical activities including gardening. Of note, pt pain most provoked with lumbar extension, particularly in standing. Patient would benefit from skilled PT to address noted impairments and to maximize functional strength and activity tolerance to promote return to PLOF. PT reviewed HEP with the patient where they demonstrated a good understanding of technique and dosage, and patient was instructed to discontinue exercises should symptoms be exacerbated. No additional referral necessary at this time.    Understanding of Dx/Px/POC: good     Prognosis: good    Goals  Short Terms Goals: (4 weeks)  Patient will be independent with HEP  Patient will report a 40-50% decrease in pain complaints.    Long Term Goals: (8-12 weeks)  Patient will be able to demonstrate WNL L/s ROM globally without limitation d/t pain  >3/10.   Patient will be able to tolerate standing for at least 20-30 without limitation d/t pain >3/10.   Patient will return to all recreational activities without restriction.  FOTO score will be greater than or equal to predicted value.     Plan  Patient would benefit from: skilled physical therapy  Planned modality interventions: cryotherapy, thermotherapy: hydrocollator packs and neuromuscular electric stimulation    Planned therapy interventions: abdominal trunk stabilization, activity modification, IASTM, joint mobilization, manual therapy, massage, balance/weight bearing training, balance, behavior modification, body mechanics training, nerve gliding, motor coordination training, neuromuscular re-education, patient/caregiver education, postural training, strengthening, stretching, flexibility, therapeutic activities, therapeutic exercise, functional ROM exercises, therapeutic training, transfer training, home exercise program and IADL retraining    Frequency: 1-2x week  Duration in weeks: 12  Plan of Care beginning date: 7/7/2025  Treatment plan discussed with: patient  Plan details: Thank you for referring Dawna Apple to PT at [facility] and for the opportunity to coordinate care.         Subjective Evaluation    History of Present Illness  Mechanism of injury: Dawna Apple presents to PT [facility] with CC of chronic, gradual onset LBP which began in October 2024 after she did a lot of heavy lifting and yard work. She reports that around that time her job also required a 1-1.5 hr commute each way which she thinks could also be a factor. She states that gradually this started to progress into radiating n/t her R hip and thigh. After that she went to PT for several weeks which was helpful. She continues to do those exercises which helps alleviate her sx. Initially she received two steroid injection in her hip for a suspected psoas tendonitis which did not provide relief. She has since completed a right sided  "lateral femoral cutaneous nerve block under ultrasound guidance which she feels has also provided minimal relief. She says sx at this point at constant. Aggravating factors include sitting in the car with lumbar support, standing and walking short distances. Easing factors include Aleve, heat at times. Pt goals are to return to her PLOF.    PSHx: None  PMHx: None  Quality of life: good    Patient Goals  Patient goals for therapy: increased strength, decreased pain, increased motion, independence with ADLs/IADLs and return to sport/leisure activities    Pain  Current pain ratin  At best pain ratin  At worst pain ratin  Quality: radiating      Diagnostic Tests    FCE comments: MRI Lumbar Spine (25): \"IMPRESSION:  Spondylosis is most pronounced at L3-4 where a right foraminal extrusion with annular fissure results in moderate to severe right foraminal stenosis with mass effect on the exiting right L3 nerve root.\"  Treatments  Previous treatment: injection treatment        Objective    Red Flags: DENIES    Do sx have a behavior to them? (I.e.: better in morning vs mid-day vs evening, better when still vs moving around): Increase in pain t/o the day.     Standing vs Walking: Increases  Rising from chair: None  Twisting/bending: Increases sx, worse than standing vs walking  Sleep: Reports she was previously a stomach sleeper, finds this now increases her sx most. Now sleeps on her side    Postural Findings: Forward head, rounded shoulders, increased t/s kyphosis, increased lumbar lordosis    Sx Response to Postural Adjustment (improved sx = likely respond to loaded flex/ext): Reduces sx     ROM:   Joint / Motion  Right  Left    Lumbar Flexion  WNL   Lumbar Extension  Limited 50%, * anterior R hip   Lumbar Sidebending  Limited 25% * WNL   Lumbar rotation Limited 25% * WNL     L Sideglide Increases sx.     Lumbar Repeated Motions Testing:  Repeated Flexion: Increase in R sided pain  Repeated Extension: " Increase in sx ---> Most provocative  Prone Lying: Increase in sx R side    Sideglide: Reports pain with L Sidelgide    HIP ROM: WNL, pain free b/l    Strength: 4+/5 Globally, except pain with R hip flexion      NEURO Screen  Reflexes  Right Left   Patellar (L3-L4) 1+ 1+   Achilles (S1-S2) 1+ 1+   Gonzalez's neg neg   Clonus neg neg     LE Myotomes: See MMTs above    Dermatomes: Intact b/l t/o    Additional Assessments  Joint Mobility: Hypomobile and painful L2-4    Special Tests:  Lumbar Specific and Neural Tension                                                                            Test / Measure  Right 7/7/2025 Left 7/7/2025   Straight Leg raise pos neg   Crossed straight leg raise neg neg               Insurance Billing Rule ReEval POC expires Auth Status Total   Visits  Start date  Expiration date PT/OT + Visit Limit? Co-Insurance Misc   [employer] CMS 8/7 8/7 NA NA 7/7/25 NA BOMN No                                                            Visit/Unit Tracking  AUTH Status: NA Date 7/7                    Visits  Authed: NA Used 1                     Remaining                           Precautions: None      Manuals 7/7            L/s Mobs                                                    Neuro Re-Ed             Pt Education HEP, POC            Sideglide??                                                                              Ther Ex             T/s seated extension HEP            Open book HEP            PPU vs EIS?             Bridge             Leg Press, when nhung                                                    Ther Activity                                       Gait Training                                       Modalities             Heat/ice

## 2025-07-10 LAB
LAB AP GYN PRIMARY INTERPRETATION: NORMAL
Lab: NORMAL
PATH INTERP SPEC-IMP: NORMAL

## 2025-07-11 ENCOUNTER — HOSPITAL ENCOUNTER (OUTPATIENT)
Dept: RADIOLOGY | Age: 45
Discharge: HOME | End: 2025-07-11
Attending: PHYSICIAN ASSISTANT
Payer: COMMERCIAL

## 2025-07-11 ENCOUNTER — APPOINTMENT (OUTPATIENT)
Dept: LAB | Age: 45
End: 2025-07-11
Payer: COMMERCIAL

## 2025-07-11 DIAGNOSIS — N93.9 ABNORMAL UTERINE BLEEDING (AUB): ICD-10-CM

## 2025-07-11 LAB
T4 FREE SERPL-MCNC: 0.89 NG/DL (ref 0.61–1.12)
TSH SERPL DL<=0.05 MIU/L-ACNC: 2.53 UIU/ML (ref 0.45–4.5)

## 2025-07-11 PROCEDURE — 36415 COLL VENOUS BLD VENIPUNCTURE: CPT

## 2025-07-11 PROCEDURE — 76856 US EXAM PELVIC COMPLETE: CPT

## 2025-07-11 PROCEDURE — 76830 TRANSVAGINAL US NON-OB: CPT

## 2025-07-11 PROCEDURE — 84443 ASSAY THYROID STIM HORMONE: CPT

## 2025-07-11 PROCEDURE — 84439 ASSAY OF FREE THYROXINE: CPT

## 2025-07-18 ENCOUNTER — OFFICE VISIT (OUTPATIENT)
Dept: PHYSICAL THERAPY | Facility: CLINIC | Age: 45
End: 2025-07-18
Attending: ORTHOPAEDIC SURGERY
Payer: COMMERCIAL

## 2025-07-18 DIAGNOSIS — M76.11 PSOAS TENDONITIS OF RIGHT SIDE: Primary | ICD-10-CM

## 2025-07-18 PROCEDURE — 97112 NEUROMUSCULAR REEDUCATION: CPT

## 2025-07-18 PROCEDURE — 97110 THERAPEUTIC EXERCISES: CPT

## 2025-07-18 NOTE — PROGRESS NOTES
Daily Note     Today's date: 2025  Patient name: Dawna Apple  : 1980  MRN: [id number]  Referring provider: Manuel Crews DO  Dx:   Encounter Diagnosis     ICD-10-CM    1. Psoas tendonitis of right side  M76.11           Start Time: 1500  Stop Time: 1527  Total time in clinic (min): 27 minutes    Subjective: States pain today is the same today in the R leg as it was last time. States her exercises have always been helpful, though he pain is unchanged.      Objective: See treatment diary below      Assessment: Tolerated treatment well. Pt responded well to forward and L lateral flexion based exercises. Patient exhibited good technique with therapeutic exercises and would benefit from continued PT      Plan: Continue per plan of care.        Insurance Billing Rule ReEval POC expires Auth Status Total   Visits  Start date  Expiration date PT/OT + Visit Limit? Co-Insurance Misc   [employer] CMS  NA 12 25 BOMN No                                                            Visit/Unit Tracking  AUTH Status: approved Date                    Visits  Authed: 12 Used 1 2                    Remaining                           Precautions: None      Manuals            L/s Mobs                                                    Neuro Re-Ed             Pt Education HEP, POC            Sideglide??                                                                              Ther Ex             T/s seated extension HEP            Open book HEP 20x ea           Seated Flexion  20x           Child's Pose + Leftward walk  15x           Bird Dog  10x ea           Standing L sidebend  20x           PPU vs EIS?             Bridge             Leg Press, when nhung  30x, 135#                                                  Ther Activity                                       Gait Training                                       Modalities             Heat/ice

## 2025-07-30 ENCOUNTER — OFFICE VISIT (OUTPATIENT)
Dept: PHYSICAL THERAPY | Facility: CLINIC | Age: 45
End: 2025-07-30
Attending: ORTHOPAEDIC SURGERY
Payer: COMMERCIAL

## 2025-07-30 DIAGNOSIS — M76.11 PSOAS TENDONITIS OF RIGHT SIDE: Primary | ICD-10-CM

## 2025-07-30 PROCEDURE — 97110 THERAPEUTIC EXERCISES: CPT

## 2025-08-01 ENCOUNTER — OFFICE VISIT (OUTPATIENT)
Dept: PHYSICAL THERAPY | Facility: CLINIC | Age: 45
End: 2025-08-01
Attending: ORTHOPAEDIC SURGERY
Payer: COMMERCIAL

## 2025-08-01 DIAGNOSIS — M76.11 PSOAS TENDONITIS OF RIGHT SIDE: Primary | ICD-10-CM

## 2025-08-01 PROCEDURE — 97110 THERAPEUTIC EXERCISES: CPT

## 2025-08-06 ENCOUNTER — OFFICE VISIT (OUTPATIENT)
Dept: PHYSICAL THERAPY | Facility: CLINIC | Age: 45
End: 2025-08-06
Attending: ORTHOPAEDIC SURGERY
Payer: COMMERCIAL

## 2025-08-06 DIAGNOSIS — M76.11 PSOAS TENDONITIS OF RIGHT SIDE: Primary | ICD-10-CM

## 2025-08-06 PROCEDURE — 97140 MANUAL THERAPY 1/> REGIONS: CPT | Performed by: PHYSICAL THERAPIST

## 2025-08-06 PROCEDURE — 97110 THERAPEUTIC EXERCISES: CPT | Performed by: PHYSICAL THERAPIST

## 2025-08-08 ENCOUNTER — OFFICE VISIT (OUTPATIENT)
Dept: PHYSICAL THERAPY | Facility: CLINIC | Age: 45
End: 2025-08-08
Attending: ORTHOPAEDIC SURGERY
Payer: COMMERCIAL

## 2025-08-08 DIAGNOSIS — M76.11 PSOAS TENDONITIS OF RIGHT SIDE: Primary | ICD-10-CM

## 2025-08-08 PROCEDURE — 97110 THERAPEUTIC EXERCISES: CPT

## 2025-08-14 ENCOUNTER — OFFICE VISIT (OUTPATIENT)
Dept: PHYSICAL THERAPY | Facility: CLINIC | Age: 45
End: 2025-08-14
Attending: ORTHOPAEDIC SURGERY
Payer: COMMERCIAL

## 2025-08-15 ENCOUNTER — OFFICE VISIT (OUTPATIENT)
Dept: PHYSICAL THERAPY | Facility: CLINIC | Age: 45
End: 2025-08-15
Attending: ORTHOPAEDIC SURGERY
Payer: COMMERCIAL

## 2025-08-22 ENCOUNTER — OFFICE VISIT (OUTPATIENT)
Dept: PHYSICAL THERAPY | Facility: CLINIC | Age: 45
End: 2025-08-22
Attending: ORTHOPAEDIC SURGERY
Payer: COMMERCIAL

## 2025-08-22 DIAGNOSIS — M76.11 PSOAS TENDONITIS OF RIGHT SIDE: Primary | ICD-10-CM

## 2025-08-22 PROCEDURE — 97110 THERAPEUTIC EXERCISES: CPT
